# Patient Record
Sex: MALE
[De-identification: names, ages, dates, MRNs, and addresses within clinical notes are randomized per-mention and may not be internally consistent; named-entity substitution may affect disease eponyms.]

---

## 2018-10-10 ENCOUNTER — APPOINTMENT (OUTPATIENT)
Dept: INTERNAL MEDICINE | Facility: CLINIC | Age: 43
End: 2018-10-10
Payer: MEDICAID

## 2018-10-10 ENCOUNTER — NON-APPOINTMENT (OUTPATIENT)
Age: 43
End: 2018-10-10

## 2018-10-10 ENCOUNTER — LABORATORY RESULT (OUTPATIENT)
Age: 43
End: 2018-10-10

## 2018-10-10 VITALS
SYSTOLIC BLOOD PRESSURE: 125 MMHG | OXYGEN SATURATION: 99 % | BODY MASS INDEX: 25.16 KG/M2 | TEMPERATURE: 97.9 F | WEIGHT: 151 LBS | HEIGHT: 65 IN | HEART RATE: 85 BPM | DIASTOLIC BLOOD PRESSURE: 74 MMHG

## 2018-10-10 DIAGNOSIS — Z78.9 OTHER SPECIFIED HEALTH STATUS: ICD-10-CM

## 2018-10-10 PROCEDURE — 99406 BEHAV CHNG SMOKING 3-10 MIN: CPT

## 2018-10-10 PROCEDURE — 93000 ELECTROCARDIOGRAM COMPLETE: CPT

## 2018-10-10 NOTE — HISTORY OF PRESENT ILLNESS
[de-identified] : PT COMES FOR CPE,LAST ONE 3 Y AGO\par LAST SEVERAL MONTHS HAS BEEN LOOSING WT ,DEVELOPED ANXIETY/DEPRESSION DUE TO STRESS AND BEEN OFF WORK,DEVELOPED DYSPEPSIA AND POOR APPETITE\par LAST 6 WEEKS FEELING MUCH BETTER;SEEN BY PSYCH AND PLACED ON WELLBUTRIN 150 MG DAILY FOR DEPRESSION AND AS AN AID FOR SMOKING CESSATION\par

## 2018-10-10 NOTE — ASSESSMENT
[FreeTextEntry1] : INITIAL CPE OF 43 Y OLD MALE WITH RECENT DX OF JIMY =ON WELLBUTRIN BY PSYCH\par RECENT WT LOSS AND SX OF ? IBS= REFER TO SEE DIETICIAN AND GASTROENTEROLOGIST\par LABS AND EKG TODAY ORDERED\par RTO AS PER RESULTS

## 2018-10-10 NOTE — COUNSELING
[Weight management counseling provided] : Weight management [Healthy eating counseling provided] : healthy eating [Activity counseling provided] : activity [Smoking cessation counseling provided] : smoking cessation [Behavioral health counseling provided] : behavioral health  [Engage in a relaxing activity] : Engage in a relaxing activity [Tobacco Use Cessation Intermediate Greater Than 3 Minutes Up to 10 Minutes] : Tobacco Use Cessation Intermediate Greater Than 3 Minutes Up to 10 Minutes

## 2018-10-11 ENCOUNTER — OTHER (OUTPATIENT)
Age: 43
End: 2018-10-11

## 2018-10-11 LAB
25(OH)D3 SERPL-MCNC: 16.7 NG/ML
ALBUMIN SERPL ELPH-MCNC: 4.4 G/DL
ALP BLD-CCNC: 74 U/L
ALT SERPL-CCNC: 13 U/L
ANION GAP SERPL CALC-SCNC: 11 MMOL/L
APPEARANCE: CLEAR
AST SERPL-CCNC: 27 U/L
BASOPHILS # BLD AUTO: 0.05 K/UL
BASOPHILS NFR BLD AUTO: 0.6 %
BILIRUB SERPL-MCNC: 0.7 MG/DL
BILIRUBIN URINE: NEGATIVE
BLOOD URINE: NEGATIVE
BUN SERPL-MCNC: 10 MG/DL
CALCIUM SERPL-MCNC: 9.7 MG/DL
CHLORIDE SERPL-SCNC: 104 MMOL/L
CHOLEST SERPL-MCNC: 160 MG/DL
CHOLEST/HDLC SERPL: 4.8 RATIO
CO2 SERPL-SCNC: 24 MMOL/L
COLOR: YELLOW
CREAT SERPL-MCNC: 0.88 MG/DL
EOSINOPHIL # BLD AUTO: 0.24 K/UL
EOSINOPHIL NFR BLD AUTO: 3.1 %
GLUCOSE QUALITATIVE U: NEGATIVE MG/DL
GLUCOSE SERPL-MCNC: 71 MG/DL
HCT VFR BLD CALC: 41.1 %
HDLC SERPL-MCNC: 33 MG/DL
HGB BLD-MCNC: 13 G/DL
IMM GRANULOCYTES NFR BLD AUTO: 0.3 %
KETONES URINE: NEGATIVE
LDLC SERPL CALC-MCNC: 113 MG/DL
LEUKOCYTE ESTERASE URINE: NEGATIVE
LYMPHOCYTES # BLD AUTO: 1.25 K/UL
LYMPHOCYTES NFR BLD AUTO: 16.2 %
MAN DIFF?: NORMAL
MCHC RBC-ENTMCNC: 19.6 PG
MCHC RBC-ENTMCNC: 31.6 GM/DL
MCV RBC AUTO: 61.9 FL
MONOCYTES # BLD AUTO: 0.53 K/UL
MONOCYTES NFR BLD AUTO: 6.9 %
NEUTROPHILS # BLD AUTO: 5.64 K/UL
NEUTROPHILS NFR BLD AUTO: 72.9 %
NITRITE URINE: NEGATIVE
PH URINE: 6
PLATELET # BLD AUTO: 258 K/UL
POTASSIUM SERPL-SCNC: 4.6 MMOL/L
PROT SERPL-MCNC: 8.3 G/DL
PROTEIN URINE: NEGATIVE MG/DL
PSA FREE FLD-MCNC: 25.3
PSA FREE SERPL-MCNC: 0.21 NG/ML
PSA SERPL-MCNC: 0.83 NG/ML
RBC # BLD: 6.64 M/UL
RBC # FLD: 18.9 %
SODIUM SERPL-SCNC: 139 MMOL/L
SPECIFIC GRAVITY URINE: 1.01
TRIGL SERPL-MCNC: 71 MG/DL
TSH SERPL-ACNC: 3.32 UIU/ML
UROBILINOGEN URINE: NEGATIVE MG/DL
WBC # FLD AUTO: 7.73 K/UL

## 2018-10-11 RX ORDER — CHOLECALCIFEROL (VITAMIN D3) 50 MCG
50 MCG TABLET ORAL DAILY
Qty: 90 | Refills: 3 | Status: COMPLETED | COMMUNITY
Start: 2018-10-11

## 2019-02-15 ENCOUNTER — APPOINTMENT (OUTPATIENT)
Dept: INTERNAL MEDICINE | Facility: CLINIC | Age: 44
End: 2019-02-15

## 2019-11-15 ENCOUNTER — LABORATORY RESULT (OUTPATIENT)
Age: 44
End: 2019-11-15

## 2019-11-15 ENCOUNTER — APPOINTMENT (OUTPATIENT)
Dept: INTERNAL MEDICINE | Facility: CLINIC | Age: 44
End: 2019-11-15
Payer: COMMERCIAL

## 2019-11-15 ENCOUNTER — NON-APPOINTMENT (OUTPATIENT)
Age: 44
End: 2019-11-15

## 2019-11-15 VITALS
HEART RATE: 78 BPM | OXYGEN SATURATION: 98 % | BODY MASS INDEX: 26.66 KG/M2 | HEIGHT: 65 IN | DIASTOLIC BLOOD PRESSURE: 74 MMHG | WEIGHT: 160 LBS | TEMPERATURE: 98.4 F | SYSTOLIC BLOOD PRESSURE: 128 MMHG | RESPIRATION RATE: 14 BRPM

## 2019-11-15 DIAGNOSIS — K40.90 UNILATERAL INGUINAL HERNIA, W/OUT OBSTRUCTION OR GANGRENE, NOT SPECIFIED AS RECURRENT: ICD-10-CM

## 2019-11-15 PROCEDURE — 99396 PREV VISIT EST AGE 40-64: CPT

## 2019-11-15 PROCEDURE — 93000 ELECTROCARDIOGRAM COMPLETE: CPT

## 2019-11-15 NOTE — PHYSICAL EXAM
[Well Nourished] : well nourished [No Acute Distress] : no acute distress [Well Developed] : well developed [Well-Appearing] : well-appearing [Normal Sclera/Conjunctiva] : normal sclera/conjunctiva [EOMI] : extraocular movements intact [PERRL] : pupils equal round and reactive to light [No JVD] : no jugular venous distention [Normal Oropharynx] : the oropharynx was normal [Normal Outer Ear/Nose] : the outer ears and nose were normal in appearance [Thyroid Normal, No Nodules] : the thyroid was normal and there were no nodules present [Supple] : supple [No Lymphadenopathy] : no lymphadenopathy [Clear to Auscultation] : lungs were clear to auscultation bilaterally [No Respiratory Distress] : no respiratory distress  [No Accessory Muscle Use] : no accessory muscle use [Regular Rhythm] : with a regular rhythm [Normal Rate] : normal rate  [Normal S1, S2] : normal S1 and S2 [No Murmur] : no murmur heard [No Carotid Bruits] : no carotid bruits [No Abdominal Bruit] : a ~M bruit was not heard ~T in the abdomen [Pedal Pulses Present] : the pedal pulses are present [No Varicosities] : no varicosities [No Palpable Aorta] : no palpable aorta [No Extremity Clubbing/Cyanosis] : no extremity clubbing/cyanosis [No Edema] : there was no peripheral edema [Soft] : abdomen soft [Non Tender] : non-tender [Non-distended] : non-distended [No Masses] : no abdominal mass palpated [Normal] : normal [Normal Bowel Sounds] : normal bowel sounds [Soft, Nontender] : the abdomen was soft and nontender [No Mass] : no masses were palpated [No HSM] : no hepatosplenomegaly noted [Inguinal Hernia Left] : a left inguinal hernia was present [] : which was reducible [Normal Posterior Cervical Nodes] : no posterior cervical lymphadenopathy [Normal Anterior Cervical Nodes] : no anterior cervical lymphadenopathy [No CVA Tenderness] : no CVA  tenderness [No Spinal Tenderness] : no spinal tenderness [No Joint Swelling] : no joint swelling [Grossly Normal Strength/Tone] : grossly normal strength/tone [Coordination Grossly Intact] : coordination grossly intact [No Rash] : no rash [Normal Gait] : normal gait [No Focal Deficits] : no focal deficits [Deep Tendon Reflexes (DTR)] : deep tendon reflexes were 2+ and symmetric [Normal Mental Status] : the patient's orientation, memory, attention, language and fund of knowledge were normal [Normal Insight/Judgement] : insight and judgment were intact [Normal Affect] : the affect was normal [Impaired judgment] : intact judgment [Appropriate] : appropriate [Impaired Insight] : intact insight

## 2019-11-15 NOTE — HISTORY OF PRESENT ILLNESS
[de-identified] : PT COMES FOR CPE \par CC OF ED FOR LAST MONTHS ,IN GOOD RELATIONSHIP WITH GIRLFRIEND \par ALSO CC OF LEFT GROIN PRESSURE/APIN LAST FEW MONTHS\par SEEN BY PSYCH ON CELEXA 30 ADDED 5/19\par STILL SMOKING BUT CUT DOWN FROM 15 TO 7 CIG A DAY

## 2019-11-15 NOTE — REVIEW OF SYSTEMS
[Abdominal Pain] : abdominal pain [Anxiety] : anxiety [Impotence] : impotence [Depression] : depression [Negative] : Heme/Lymph

## 2019-11-15 NOTE — ASSESSMENT
[FreeTextEntry1] : CPE OF 44 Y OLD MALE WITH PMX OF DEPRESSION = F/U PSYCH\par IBS= AS PER GI\par NEWLY DX LEFT INGUINAL HERNIA= REFER TO SEE SURGEON\par ED= TRIAL OF LEVITRA 20 MG DAILY ;REFER TO SEE

## 2019-11-15 NOTE — COUNSELING
[Encouraged to pick a quit date and identify support needed to quit] : Encouraged to pick a quit date and identify support needed to quit [Cessation strategies including cessation program discussed] : Cessation strategies including cessation program discussed [Tobacco Use Cessation Intermediate Greater Than 3 Minutes Up to 10 Minutes] : Tobacco Use Cessation Intermediate Greater Than 3 Minutes Up to 10 Minutes [FreeTextEntry1] : 3

## 2019-11-17 LAB
25(OH)D3 SERPL-MCNC: 39.3 NG/ML
ALBUMIN SERPL ELPH-MCNC: 4.2 G/DL
ALP BLD-CCNC: 78 U/L
ALT SERPL-CCNC: 18 U/L
ANION GAP SERPL CALC-SCNC: 14 MMOL/L
APPEARANCE: CLEAR
AST SERPL-CCNC: 19 U/L
BASOPHILS # BLD AUTO: 0.07 K/UL
BASOPHILS NFR BLD AUTO: 1 %
BILIRUB SERPL-MCNC: 0.8 MG/DL
BILIRUBIN URINE: NEGATIVE
BLOOD URINE: NEGATIVE
BUN SERPL-MCNC: 12 MG/DL
CALCIUM SERPL-MCNC: 9.4 MG/DL
CHLORIDE SERPL-SCNC: 104 MMOL/L
CHOLEST SERPL-MCNC: 147 MG/DL
CHOLEST/HDLC SERPL: 5.1 RATIO
CO2 SERPL-SCNC: 23 MMOL/L
COLOR: YELLOW
CREAT SERPL-MCNC: 1.18 MG/DL
EOSINOPHIL # BLD AUTO: 0.36 K/UL
EOSINOPHIL NFR BLD AUTO: 5 %
GLUCOSE QUALITATIVE U: NEGATIVE
GLUCOSE SERPL-MCNC: 87 MG/DL
HCT VFR BLD CALC: 40.5 %
HDLC SERPL-MCNC: 29 MG/DL
HGB BLD-MCNC: 12.3 G/DL
IMM GRANULOCYTES NFR BLD AUTO: 0.4 %
KETONES URINE: NEGATIVE
LDLC SERPL CALC-MCNC: 102 MG/DL
LEUKOCYTE ESTERASE URINE: NEGATIVE
LYMPHOCYTES # BLD AUTO: 1.49 K/UL
LYMPHOCYTES NFR BLD AUTO: 20.5 %
MAN DIFF?: NORMAL
MCHC RBC-ENTMCNC: 19 PG
MCHC RBC-ENTMCNC: 30.4 GM/DL
MCV RBC AUTO: 62.7 FL
MONOCYTES # BLD AUTO: 0.53 K/UL
MONOCYTES NFR BLD AUTO: 7.3 %
NEUTROPHILS # BLD AUTO: 4.79 K/UL
NEUTROPHILS NFR BLD AUTO: 65.8 %
NITRITE URINE: NEGATIVE
PH URINE: 6.5
PLATELET # BLD AUTO: 233 K/UL
POTASSIUM SERPL-SCNC: 4.2 MMOL/L
PROT SERPL-MCNC: 7.6 G/DL
PROTEIN URINE: NORMAL
PSA FREE FLD-MCNC: 22 %
PSA FREE SERPL-MCNC: 0.11 NG/ML
PSA SERPL-MCNC: 0.52 NG/ML
RBC # BLD: 6.46 M/UL
RBC # FLD: 19.5 %
SODIUM SERPL-SCNC: 141 MMOL/L
SPECIFIC GRAVITY URINE: 1.01
TRIGL SERPL-MCNC: 80 MG/DL
TSH SERPL-ACNC: 3.51 UIU/ML
UROBILINOGEN URINE: NORMAL
WBC # FLD AUTO: 7.27 K/UL

## 2019-11-21 LAB
TESTOST BND SERPL-MCNC: 8 PG/ML
TESTOST SERPL-MCNC: 519.5 NG/DL

## 2021-11-19 ENCOUNTER — NON-APPOINTMENT (OUTPATIENT)
Age: 46
End: 2021-11-19

## 2021-11-19 ENCOUNTER — APPOINTMENT (OUTPATIENT)
Dept: INTERNAL MEDICINE | Facility: CLINIC | Age: 46
End: 2021-11-19
Payer: COMMERCIAL

## 2021-11-19 ENCOUNTER — LABORATORY RESULT (OUTPATIENT)
Age: 46
End: 2021-11-19

## 2021-11-19 VITALS
HEART RATE: 85 BPM | WEIGHT: 170 LBS | TEMPERATURE: 98.9 F | SYSTOLIC BLOOD PRESSURE: 100 MMHG | RESPIRATION RATE: 14 BRPM | HEIGHT: 65 IN | OXYGEN SATURATION: 96 % | DIASTOLIC BLOOD PRESSURE: 60 MMHG | BODY MASS INDEX: 28.32 KG/M2

## 2021-11-19 DIAGNOSIS — Z00.00 ENCOUNTER FOR GENERAL ADULT MEDICAL EXAMINATION W/OUT ABNORMAL FINDINGS: ICD-10-CM

## 2021-11-19 PROCEDURE — 99406 BEHAV CHNG SMOKING 3-10 MIN: CPT

## 2021-11-19 PROCEDURE — 93000 ELECTROCARDIOGRAM COMPLETE: CPT

## 2021-11-19 PROCEDURE — 99396 PREV VISIT EST AGE 40-64: CPT

## 2021-11-19 PROCEDURE — 99213 OFFICE O/P EST LOW 20 MIN: CPT

## 2021-11-19 NOTE — PHYSICAL EXAM
[No Acute Distress] : no acute distress [Well Nourished] : well nourished [Well Developed] : well developed [Well-Appearing] : well-appearing [Normal Sclera/Conjunctiva] : normal sclera/conjunctiva [PERRL] : pupils equal round and reactive to light [EOMI] : extraocular movements intact [No JVD] : no jugular venous distention [No Lymphadenopathy] : no lymphadenopathy [Supple] : supple [Thyroid Normal, No Nodules] : the thyroid was normal and there were no nodules present [No Respiratory Distress] : no respiratory distress  [No Accessory Muscle Use] : no accessory muscle use [Decreased Breath Sounds] : breath sounds were decreased diffusely [Normal Rate] : normal rate  [Regular Rhythm] : with a regular rhythm [Normal S1, S2] : normal S1 and S2 [No Murmur] : no murmur heard [No Carotid Bruits] : no carotid bruits [No Abdominal Bruit] : a ~M bruit was not heard ~T in the abdomen [No Varicosities] : no varicosities [Pedal Pulses Present] : the pedal pulses are present [No Edema] : there was no peripheral edema [No Palpable Aorta] : no palpable aorta [No Extremity Clubbing/Cyanosis] : no extremity clubbing/cyanosis [Soft] : abdomen soft [Non Tender] : non-tender [Non-distended] : non-distended [No Masses] : no abdominal mass palpated [No HSM] : no HSM [Normal Bowel Sounds] : normal bowel sounds [Normal Posterior Cervical Nodes] : no posterior cervical lymphadenopathy [Normal Anterior Cervical Nodes] : no anterior cervical lymphadenopathy [No CVA Tenderness] : no CVA  tenderness [No Spinal Tenderness] : no spinal tenderness [No Joint Swelling] : no joint swelling [Grossly Normal Strength/Tone] : grossly normal strength/tone [No Rash] : no rash [Coordination Grossly Intact] : coordination grossly intact [No Focal Deficits] : no focal deficits [Normal Affect] : the affect was normal [Normal Insight/Judgement] : insight and judgment were intact

## 2021-11-19 NOTE — COUNSELING
[Cessation strategies including cessation program discussed] : Cessation strategies including cessation program discussed [Encouraged to pick a quit date and identify support needed to quit] : Encouraged to pick a quit date and identify support needed to quit [Tobacco Use Cessation Intermediate Greater Than 3 Minutes Up to 10 Minutes] : Tobacco Use Cessation Intermediate Greater Than 3 Minutes Up to 10 Minutes [FreeTextEntry1] : 5

## 2021-11-19 NOTE — ASSESSMENT
[FreeTextEntry1] : CPE OF 46 Y OLD MALE= LABS AND EKG SMOKING CESSATION COUNSELING ,AGAINST MEDS\par JIMY= RECOMM PSYCH EVAL FOR WORSENING SX\par CHRONIC COUGH = PULM EVAL RTO 1Y OR PRN \par HAD J&J VACCINE 4/21= RECOMM BOOSTER
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
## 2021-11-19 NOTE — REVIEW OF SYSTEMS
[Fatigue] : fatigue [Cough] : cough [Joint Pain] : joint pain [Back Pain] : back pain [Headache] : no headache [Anxiety] : anxiety [Depression] : depression [Negative] : Heme/Lymph

## 2021-11-20 LAB
25(OH)D3 SERPL-MCNC: 17.3 NG/ML
ALBUMIN SERPL ELPH-MCNC: 4.5 G/DL
ALP BLD-CCNC: 97 U/L
ALT SERPL-CCNC: 27 U/L
ANION GAP SERPL CALC-SCNC: 16 MMOL/L
APPEARANCE: CLEAR
AST SERPL-CCNC: 26 U/L
BASOPHILS # BLD AUTO: 0.07 K/UL
BASOPHILS NFR BLD AUTO: 0.7 %
BILIRUB SERPL-MCNC: 0.5 MG/DL
BILIRUBIN URINE: NEGATIVE
BLOOD URINE: NEGATIVE
BUN SERPL-MCNC: 17 MG/DL
CALCIUM SERPL-MCNC: 9.6 MG/DL
CHLORIDE SERPL-SCNC: 105 MMOL/L
CHOLEST SERPL-MCNC: 144 MG/DL
CO2 SERPL-SCNC: 21 MMOL/L
COLOR: COLORLESS
CREAT SERPL-MCNC: 1.04 MG/DL
EOSINOPHIL # BLD AUTO: 0.25 K/UL
EOSINOPHIL NFR BLD AUTO: 2.4 %
GLUCOSE QUALITATIVE U: NEGATIVE
GLUCOSE SERPL-MCNC: 81 MG/DL
HCT VFR BLD CALC: 45.6 %
HDLC SERPL-MCNC: 30 MG/DL
HGB BLD-MCNC: 13.8 G/DL
IMM GRANULOCYTES NFR BLD AUTO: 0.4 %
KETONES URINE: NEGATIVE
LDLC SERPL CALC-MCNC: 88 MG/DL
LEUKOCYTE ESTERASE URINE: NEGATIVE
LYMPHOCYTES # BLD AUTO: 2.13 K/UL
LYMPHOCYTES NFR BLD AUTO: 20.9 %
MAN DIFF?: NORMAL
MCHC RBC-ENTMCNC: 19.7 PG
MCHC RBC-ENTMCNC: 30.3 GM/DL
MCV RBC AUTO: 65.2 FL
MONOCYTES # BLD AUTO: 0.66 K/UL
MONOCYTES NFR BLD AUTO: 6.5 %
NEUTROPHILS # BLD AUTO: 7.06 K/UL
NEUTROPHILS NFR BLD AUTO: 69.1 %
NITRITE URINE: NEGATIVE
NONHDLC SERPL-MCNC: 114 MG/DL
PH URINE: 6.5
PLATELET # BLD AUTO: 256 K/UL
POTASSIUM SERPL-SCNC: 4.4 MMOL/L
PROT SERPL-MCNC: 8 G/DL
PROTEIN URINE: NEGATIVE
PSA FREE FLD-MCNC: 31 %
PSA FREE SERPL-MCNC: 0.21 NG/ML
PSA SERPL-MCNC: 0.69 NG/ML
RBC # BLD: 6.99 M/UL
RBC # FLD: 21 %
SODIUM SERPL-SCNC: 142 MMOL/L
SPECIFIC GRAVITY URINE: 1.01
TRIGL SERPL-MCNC: 132 MG/DL
TSH SERPL-ACNC: 3.65 UIU/ML
UROBILINOGEN URINE: NORMAL
WBC # FLD AUTO: 10.21 K/UL

## 2021-11-23 DIAGNOSIS — E55.9 VITAMIN D DEFICIENCY, UNSPECIFIED: ICD-10-CM

## 2021-12-16 ENCOUNTER — APPOINTMENT (OUTPATIENT)
Dept: PULMONOLOGY | Facility: CLINIC | Age: 46
End: 2021-12-16
Payer: COMMERCIAL

## 2021-12-16 ENCOUNTER — APPOINTMENT (OUTPATIENT)
Dept: HEART AND VASCULAR | Facility: CLINIC | Age: 46
End: 2021-12-16
Payer: COMMERCIAL

## 2021-12-16 VITALS
WEIGHT: 172 LBS | DIASTOLIC BLOOD PRESSURE: 77 MMHG | HEIGHT: 65 IN | RESPIRATION RATE: 14 BRPM | SYSTOLIC BLOOD PRESSURE: 121 MMHG | OXYGEN SATURATION: 96 % | HEART RATE: 90 BPM | BODY MASS INDEX: 28.66 KG/M2 | TEMPERATURE: 97.2 F

## 2021-12-16 DIAGNOSIS — G47.8 OTHER SLEEP DISORDERS: ICD-10-CM

## 2021-12-16 PROCEDURE — 99204 OFFICE O/P NEW MOD 45 MIN: CPT | Mod: 25

## 2021-12-16 PROCEDURE — ZZZZZ: CPT

## 2021-12-16 PROCEDURE — 93306 TTE W/DOPPLER COMPLETE: CPT

## 2021-12-16 PROCEDURE — 94060 EVALUATION OF WHEEZING: CPT

## 2021-12-16 PROCEDURE — 94726 PLETHYSMOGRAPHY LUNG VOLUMES: CPT

## 2021-12-16 PROCEDURE — 94729 DIFFUSING CAPACITY: CPT

## 2021-12-16 NOTE — ASSESSMENT
[FreeTextEntry1] : 46-year-old man with no past medical history, active smoker here for first evaluation with this provider.\par \par CHEST PAIN\par -Most likely noncardiac in origin, and  likely related to anxiety\par -in the setting of persistence of symptoms and severe limitations to his everyday life will obtain exercise stress test to rule out ischemia\par -echocardiogram to rule out any wall motion abnormalities\par \par HTN\par -Well-controlled off medications\par \par HLD\par -LDL well controlled off medication\par -Continue with healthy diet and exercise\par \par Follow-up in 2 weeks for echo and stress test results

## 2021-12-16 NOTE — HISTORY OF PRESENT ILLNESS
[Current] : current [Never] : never [TextBox_4] : Patient is  a 46-year-old male with past medical history significant for depression, 1 pack/day smoker who is referred for pulmonary consult.  The patient complains of occasional cough and questionable pleuritic pain as well as back pain.  Patient also complains of nonrestorative sleep and morning headaches.  He has been told that he snores.  Patient also states that he works in a mold infested area.  He denies fevers chills chest pain weight loss or hemoptysis [TextBox_11] : 1

## 2021-12-16 NOTE — REASON FOR VISIT
[FreeTextEntry1] : 46-year-old man with no past medical history here for first evaluation with this provider.\par Patient is  is an active smoker

## 2021-12-16 NOTE — ASSESSMENT
[FreeTextEntry1] : In summary the patient is a 46-year-old male with past medical history significant for tobacco abuse, anxiety and depression who was referred for pulmonary consult.  The patient's physical exam is within normal limits.  The patient underwent a pulmonary function test which revealed normal airways disease.  The patient is now started on Stiolto.\par \par Patient also complains of nonrestorative sleep morning headaches and awakening with a dry mouth.  His physical exam is significant for Mallampati score of 3.  A home sleep monitor has been ordered and the patient is instructed to follow-up in 2 to 4 weeks

## 2021-12-22 ENCOUNTER — APPOINTMENT (OUTPATIENT)
Dept: INTERNAL MEDICINE | Facility: CLINIC | Age: 46
End: 2021-12-22
Payer: COMMERCIAL

## 2021-12-22 VITALS
OXYGEN SATURATION: 97 % | SYSTOLIC BLOOD PRESSURE: 134 MMHG | BODY MASS INDEX: 28.49 KG/M2 | RESPIRATION RATE: 16 BRPM | HEIGHT: 65 IN | WEIGHT: 171 LBS | HEART RATE: 110 BPM | DIASTOLIC BLOOD PRESSURE: 86 MMHG | TEMPERATURE: 98.8 F

## 2021-12-22 PROCEDURE — 99214 OFFICE O/P EST MOD 30 MIN: CPT

## 2021-12-22 RX ORDER — BUPROPION HYDROCHLORIDE 300 MG/1
300 TABLET, EXTENDED RELEASE ORAL
Refills: 0 | Status: DISCONTINUED | COMMUNITY
End: 2021-12-22

## 2021-12-22 RX ORDER — VARDENAFIL 20 MG/1
20 TABLET, FILM COATED ORAL
Qty: 5 | Refills: 3 | Status: DISCONTINUED | COMMUNITY
Start: 2019-11-15 | End: 2021-12-22

## 2021-12-22 RX ORDER — CITALOPRAM 20 MG/1
20 TABLET, FILM COATED ORAL
Refills: 0 | Status: DISCONTINUED | COMMUNITY
End: 2021-12-22

## 2021-12-22 NOTE — ASSESSMENT
[FreeTextEntry1] : 46 Y OLD MALE WITH R LAT EPICONDYLITIS= ORTHO EVAL \par NOTE FOR WORK WILL BE PROVIDED\par JIMY= PSYCH RECOMM\par PT WAS REQUESTING TESTOSTERONE ,CORTISOL AND DOPAMINE SERUM LEVELS ;EXPLAINED THAT IS NO INDICATION FOR SUCH TESTING UNLESS RECOMM BY ENDO OR PSYCH

## 2021-12-22 NOTE — HISTORY OF PRESENT ILLNESS
[de-identified] : CC OF R FOREARM PAIN ,DIFFICULTY TO ZIP HER JACKET OR SHAKE HANDS ;HAS NOT BEEN ABLE TO GO BACK TO WORK \par ENERGY AND BREATHING BETTER '\par LESS ANXIETY AND MOOD SX \par SEEN BY PULM AND CARDIO\par NEEDS NOTE FOR WORK \par SEEN BY THERAPIST ,MAY SEE PSYCH SOON

## 2021-12-22 NOTE — REVIEW OF SYSTEMS
[Joint Pain] : joint pain [Muscle Pain] : muscle pain [Anxiety] : anxiety [Depression] : depression [Negative] : Heme/Lymph

## 2021-12-22 NOTE — PHYSICAL EXAM
[Normal] : no jugular venous distention, supple, no lymphadenopathy and the thyroid was normal and there were no nodules present [No Respiratory Distress] : no respiratory distress  [Normal Rate] : normal rate  [No Edema] : there was no peripheral edema [Normal Mental Status] : the patient's orientation, memory, attention, language and fund of knowledge were normal [Appropriate] : appropriate [Labile] : labile [Impaired judgment] : intact judgment [Impaired Insight] : intact insight [de-identified] : R LATERAL EPICONDYLAR PAIN

## 2021-12-30 ENCOUNTER — APPOINTMENT (OUTPATIENT)
Dept: ORTHOPEDIC SURGERY | Facility: CLINIC | Age: 46
End: 2021-12-30
Payer: COMMERCIAL

## 2021-12-30 VITALS — WEIGHT: 170 LBS | BODY MASS INDEX: 27.32 KG/M2 | HEIGHT: 66 IN

## 2021-12-30 VITALS — SYSTOLIC BLOOD PRESSURE: 132 MMHG | DIASTOLIC BLOOD PRESSURE: 82 MMHG | HEART RATE: 101 BPM

## 2021-12-30 DIAGNOSIS — M25.511 PAIN IN RIGHT SHOULDER: ICD-10-CM

## 2021-12-30 DIAGNOSIS — G89.29 PAIN IN RIGHT SHOULDER: ICD-10-CM

## 2021-12-30 DIAGNOSIS — M77.11 LATERAL EPICONDYLITIS, RIGHT ELBOW: ICD-10-CM

## 2021-12-30 PROCEDURE — 73030 X-RAY EXAM OF SHOULDER: CPT | Mod: RT

## 2021-12-30 PROCEDURE — 99203 OFFICE O/P NEW LOW 30 MIN: CPT

## 2021-12-30 PROCEDURE — 72040 X-RAY EXAM NECK SPINE 2-3 VW: CPT

## 2022-01-03 ENCOUNTER — APPOINTMENT (OUTPATIENT)
Dept: ORTHOPEDIC SURGERY | Facility: CLINIC | Age: 47
End: 2022-01-03

## 2022-01-04 ENCOUNTER — APPOINTMENT (OUTPATIENT)
Dept: HEART AND VASCULAR | Facility: CLINIC | Age: 47
End: 2022-01-04

## 2022-01-05 PROBLEM — M25.511 CHRONIC RIGHT SHOULDER PAIN: Status: ACTIVE | Noted: 2022-01-05

## 2022-01-05 PROBLEM — M77.11 LATERAL EPICONDYLITIS OF RIGHT ELBOW: Status: ACTIVE | Noted: 2021-12-22
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
## 2022-01-05 NOTE — DISCUSSION/SUMMARY
[de-identified] : 47 y/o male with right cervical/periscapular pain and right lateral epicondylitis. \par \par Patient has myofascial discomfort through the right upper extremity throughout the periscapular/cervical/shoulder region. There is mild radiation of symptoms to the upper extremity consistent with a neurologic or component.  X-ray shows some degenerative change from C4-C6.  There is also mild pain with range of motion of right shoulder. I discussed with the patient that the majority of symptoms are not directly related to shoulder internal derangement/pathology and may or may not be directly related to cervical dysfunction. Treatment for this condition is through conservative/nonoperative means; including anti-inflammatory medication, physical therapy, massage therapy, acupuncture, and chiropractic care.\par \par Patient also presents with right elbow pain. I discussed with the patient the treatment of lateral epicondylitis. I discussed that this is a degenerative condition rather than an inflammatory condition and that the process tends to be reversible (albeit can last from 6-24mos if __  untreated). The best source of treatment is to reduce the offending repetitive overuse injury process and I counseled the patient on how to do this. First line treatment can include watchful waiting for a period of 6-8 wks with specific activity modification and OTC NSAID's/Tylenol. Further treatment includes the use of counterforce bracing, physical therapy for stretching and strengthening, and the use of injection therapy (steroids/PRP etc). I also discussed the potential role of surgical intervention for chronic symptoms that persist greater than 6-12 months.\par \par At this time as treatment I recommended a period of relative rest, stretching and strengthening modalities as indicated in a patient handout provided as well as counterforce bracing. If no improvement over the next few months, additional treatment such as corticosteroid injection versus formal physical therapy can be performed.\par \par Follow-up with Spine orthopedic.

## 2022-01-05 NOTE — ADDENDUM
[FreeTextEntry1] : This note was written by Monica Perez on 12/30/2021 acting solely as a scribe for Dr. Allan Sharif.\par \par All medical record entries made by the Scribe were at my, Dr. Allan Sharif, direction and personally dictated by me on 12/30/2021. I have personally reviewed the chart and agree that the record accurately reflects my personal performance of the history, physical exam, assessment and plan.

## 2022-01-05 NOTE — PHYSICAL EXAM
[de-identified] : Oriented to time, place, person\par Mood: Normal\par Affect: Normal\par Appearance: Healthy, well appearing, no acute distress.\par Gait: Normal\par Assistive Devices: None\par \par Right shoulder exam:\par \par Inspection: No malalignment, No defects, No atrophy\par Skin: No masses, No lesions\par Neck: Negative Spurling, full ROM, no pain with ROM\par AROM: FF to 180, abduction to 90, ER to 60, IR to upper lumbar\par Painful arc ROM: none\par Tenderness: +trapezius pain,  +ttp medial scapula pain. No bicipital tenderness, no tenderness to greater tuberosity/RTC insertion, no anterior shoulder/lesser tuberosity tenderness\par Strength: 5/5 ER, 5/5 IR in adduction, 5/5 supraspinatus testing, negative Verden's test\par AC joint: No TTP/pain with cross arm testing\par Biceps: Speed Negative, Yergason Negative \par Impingement test: Negative Wade, Negative Neer\par Vasc: 2+ radial pulse \par Stability: Stable \par Neuro: AIN, PIN, Ulnar nerve intact to motor\par Sensation: Intact to light touch throughout \par \par Right elbow exam\par \par Skin: Clean, dry, intact. No ecchymosis. No swelling. No palpable joint effusion.\par ROM: Full extension/flexion, full supination/pronation.\par Painful ROM: Lateral elbow pain with resisted wrist extension\par Tenderness: No medial epicondyle pain. Positive lateral epicondyle pain (ECRB). No olecranon pain. No pain at radial head. No pain to radial tunnel.\par Strength: 5/5 elbow flexion, 5/5 elbow extension, 5/5 supination, 5/5 pronation\par Stability: Stable to vaus/valgus stress\par Vasc: 2+ radial pulse, <2s cap refill\par Sensation: In tact to light touch throughout\par Neuro: Negative Tinel at ulnar canal, AIN/PIN/Ulnar nerve in tact to motor/sensation.  [de-identified] : The following radiographs were ordered and read by me during this patients visit. I reviewed each radiograph in detail with the patient and discussed the findings as highlighted below.\par \par 3 views of right shoulder were obtained today, 12/30/2021, that show no acute fracture or dislocation. There is no glenohumeral and mild AC joint degenerative change seen. Type II acromion. There is no significant malalignment. No significant other obvious osseous abnormality, otherwise unremarkable. \par \par 3 views of the cervical spine were obtained today, 12/30/2021, that show no acute fracture or dislocation. There is C 4-6 degenerative disk change seen. There is no gross malalignment. No significant other obvious osseous abnormality, otherwise unremarkable.

## 2022-01-11 ENCOUNTER — APPOINTMENT (OUTPATIENT)
Dept: HEART AND VASCULAR | Facility: CLINIC | Age: 47
End: 2022-01-11

## 2022-01-25 ENCOUNTER — APPOINTMENT (OUTPATIENT)
Dept: HEART AND VASCULAR | Facility: CLINIC | Age: 47
End: 2022-01-25

## 2022-01-25 ENCOUNTER — APPOINTMENT (OUTPATIENT)
Dept: PULMONOLOGY | Facility: CLINIC | Age: 47
End: 2022-01-25

## 2022-02-10 ENCOUNTER — APPOINTMENT (OUTPATIENT)
Dept: PULMONOLOGY | Facility: CLINIC | Age: 47
End: 2022-02-10
Payer: COMMERCIAL

## 2022-02-10 VITALS
TEMPERATURE: 98.2 F | HEART RATE: 97 BPM | SYSTOLIC BLOOD PRESSURE: 131 MMHG | RESPIRATION RATE: 14 BRPM | DIASTOLIC BLOOD PRESSURE: 80 MMHG | OXYGEN SATURATION: 98 %

## 2022-02-10 DIAGNOSIS — R06.83 SNORING: ICD-10-CM

## 2022-02-10 DIAGNOSIS — F17.210 NICOTINE DEPENDENCE, CIGARETTES, UNCOMPLICATED: ICD-10-CM

## 2022-02-10 DIAGNOSIS — F32.9 MAJOR DEPRESSIVE DISORDER, SINGLE EPISODE, UNSPECIFIED: ICD-10-CM

## 2022-02-10 DIAGNOSIS — F12.90 CANNABIS USE, UNSPECIFIED, UNCOMPLICATED: ICD-10-CM

## 2022-02-10 DIAGNOSIS — Z78.9 OTHER SPECIFIED HEALTH STATUS: ICD-10-CM

## 2022-02-10 DIAGNOSIS — R06.00 DYSPNEA, UNSPECIFIED: ICD-10-CM

## 2022-02-10 PROCEDURE — 99214 OFFICE O/P EST MOD 30 MIN: CPT

## 2022-02-10 NOTE — HISTORY OF PRESENT ILLNESS
[Current] : current [Never] : never [TextBox_4] : Patient is a 46-year-old male with past medical history significant for depression, nicotine abuse/asthma who presents today for follow-up.  Patient states that his cough and shortness of breath with associated dyspnea on exertion has improved with Stiolto.  The patient admits to and expresses going through bouts of depression over the last year and a half.  He currently denies fevers chills chest pain weight loss or hemoptysis

## 2022-02-10 NOTE — ASSESSMENT
[FreeTextEntry1] : In summary the patient is a 46-year-old male with past medical history significant for tobacco abuse, anxiety and depression who presents for follow up\par Patient's physical exam is significant for good air entry bilaterally.  I had a lengthy discussion with the patient and we both agree that he is depressed because of his current work situation and staying at home.  I have encouraged him to exercise.  He also states that his bronchodilators have helped significantly and therefore a repeat prescription has been sent to his pharmacy and he is instructed to follow-up in 3 months

## 2022-02-17 ENCOUNTER — APPOINTMENT (OUTPATIENT)
Dept: HEART AND VASCULAR | Facility: CLINIC | Age: 47
End: 2022-02-17

## 2022-02-28 ENCOUNTER — APPOINTMENT (OUTPATIENT)
Dept: INTERNAL MEDICINE | Facility: CLINIC | Age: 47
End: 2022-02-28
Payer: COMMERCIAL

## 2022-02-28 VITALS
WEIGHT: 169 LBS | TEMPERATURE: 98.2 F | HEIGHT: 66 IN | BODY MASS INDEX: 27.16 KG/M2 | HEART RATE: 93 BPM | DIASTOLIC BLOOD PRESSURE: 75 MMHG | OXYGEN SATURATION: 97 % | SYSTOLIC BLOOD PRESSURE: 147 MMHG | RESPIRATION RATE: 14 BRPM

## 2022-02-28 DIAGNOSIS — F32.A DEPRESSION, UNSPECIFIED: ICD-10-CM

## 2022-02-28 DIAGNOSIS — F17.211 NICOTINE DEPENDENCE, CIGARETTES, IN REMISSION: ICD-10-CM

## 2022-02-28 DIAGNOSIS — N52.9 MALE ERECTILE DYSFUNCTION, UNSPECIFIED: ICD-10-CM

## 2022-02-28 DIAGNOSIS — E66.3 OVERWEIGHT: ICD-10-CM

## 2022-02-28 PROCEDURE — 99407 BEHAV CHNG SMOKING > 10 MIN: CPT

## 2022-02-28 PROCEDURE — 99214 OFFICE O/P EST MOD 30 MIN: CPT

## 2022-02-28 NOTE — PHYSICAL EXAM
[Normal] : soft, non-tender, non-distended, no masses palpated, no HSM and normal bowel sounds [Alert and Oriented x3] : oriented to person, place, and time Cerclage placement

## 2022-02-28 NOTE — COUNSELING
[Cessation strategies including cessation program discussed] : Cessation strategies including cessation program discussed [Use of nicotine replacement therapies and other medications discussed] : Use of nicotine replacement therapies and other medications discussed [Encouraged to pick a quit date and identify support needed to quit] : Encouraged to pick a quit date and identify support needed to quit [FreeTextEntry3] : 12 [Benefits of weight loss discussed] : Benefits of weight loss discussed [Encouraged to increase physical activity] : Encouraged to increase physical activity

## 2022-02-28 NOTE — HISTORY OF PRESENT ILLNESS
[de-identified] : COMES FOR F/U\par CC OF ED 2 WEEKS AGO WHEN ON A DATE \par TRYING TO QUIT SMOKING BUT UNABLE TO CUT DOWN BELLOW 3/4 PPD \par EXERCISING MORE \par FEELING BETTER \par UNABLE TO LOOSE WT ,WANT TO SEE DIETICIAN

## 2022-02-28 NOTE — ASSESSMENT
[FreeTextEntry1] : 46 Y OLD MALE WITH DEPRESSION AND 3/4-1 PPD SMOKER WHO HAD QUIT 5-6 Y AGO WITH BUPROPION = RX SENT ,COUNSELING PROVIDED ,AMISHA REPLACEMENT AND CHANTIX DISCUSSED AS WELL \par ED = S[facility]DENA[facility] RX SENT ,REFER TO SEE EV \par RTO 2 M OR PRN

## 2022-03-03 ENCOUNTER — APPOINTMENT (OUTPATIENT)
Dept: HEART AND VASCULAR | Facility: CLINIC | Age: 47
End: 2022-03-03
Payer: COMMERCIAL

## 2022-03-03 VITALS
HEART RATE: 101 BPM | HEIGHT: 66 IN | BODY MASS INDEX: 27.16 KG/M2 | OXYGEN SATURATION: 96 % | DIASTOLIC BLOOD PRESSURE: 82 MMHG | SYSTOLIC BLOOD PRESSURE: 121 MMHG | WEIGHT: 169 LBS | TEMPERATURE: 98.4 F | RESPIRATION RATE: 15 BRPM

## 2022-03-03 PROCEDURE — 99214 OFFICE O/P EST MOD 30 MIN: CPT | Mod: 25

## 2022-03-03 PROCEDURE — 93015 CV STRESS TEST SUPVJ I&R: CPT

## 2022-03-03 PROCEDURE — ZZZZZ: CPT

## 2022-03-03 NOTE — ASSESSMENT
[FreeTextEntry1] : 46-year-old man with no past medical history, active smoker here for first evaluation with this provider.\par \par CHEST PAIN/SOB\par -Resolved\par -As per description was most likely noncardiac in origin, and  likely related to anxiety\par -Normal EKG\par -Normal echocardiogram\par -Normal exercise stress test\par -In the setting of the above, recommend to continue to monitor and follow-up with her primary MD\par -Recommended smoking cessation \par \par HTN\par -Well-controlled off medications\par \par HLD\par -LDL well controlled off medication\par -Continue with healthy diet and exercise\par \par Follow-up in cardiology clinic as needed \par Recommend continue to follow-up with primary MD

## 2022-03-03 NOTE — REASON FOR VISIT
[FreeTextEntry1] : 46-year-old man with no past medical history, active smoker here for follow-up and exercise stress test\par The patient denies any chest pain.\par Reports a good exercise tolerance with episode of shortness of breath.\par He is still smoking.\par Echocardiogram reviewed\par \par \par As per previous note on 12/22/2021:\par 46-year-old man with no past medical history, active smoker here for first evaluation with this provider.\par The patient reports feeling very anxious and generalized body pain including right arm pain and  chest tightess, described a pinch radiating to left shoulder. \par Also with atypical tingling sensation and shortness of breath in the morning\par Can walk 2 miiles without anginal pain.  Practices martial arts 3 times a week without any problems\par \par he is now on leave of absence from work because of this generalized pains\par \par PMH \par none\par \par ALL\par NKDA\par \par MEDS\par none\par \par SH\par SMokes MJ, social etoh\par \par \par EKG 11/19/2021 SR, wnl\par EKG 3/3/2022: Sinus rhythm, within normal limits\par \par Labs 11/20/2021\par Triglycerides 132\par Cholesterol 144\par HDL 30\par LDL 88\par \par \par Echocardiogram 12/17/2021\par EF of 60 to 65%\par Normal mitral valve\par \par Exercise stress test 3/3/2022\par Patient exercised for 8 minutes 59 seconds on a treadmill achieving 91% of MPHR\par Negative exercise stress test\par

## 2022-03-04 ENCOUNTER — APPOINTMENT (OUTPATIENT)
Dept: UROLOGY | Facility: CLINIC | Age: 47
End: 2022-03-04

## 2022-03-13 DIAGNOSIS — M54.2 CERVICALGIA: ICD-10-CM

## 2022-03-28 ENCOUNTER — APPOINTMENT (OUTPATIENT)
Dept: ORTHOPEDIC SURGERY | Facility: CLINIC | Age: 47
End: 2022-03-28

## 2024-03-25 ENCOUNTER — APPOINTMENT (OUTPATIENT)
Dept: INTERNAL MEDICINE | Facility: CLINIC | Age: 49
End: 2024-03-25

## 2024-05-14 ENCOUNTER — APPOINTMENT (OUTPATIENT)
Dept: INTERNAL MEDICINE | Facility: CLINIC | Age: 49
End: 2024-05-14
Payer: COMMERCIAL

## 2024-05-14 VITALS
SYSTOLIC BLOOD PRESSURE: 124 MMHG | DIASTOLIC BLOOD PRESSURE: 83 MMHG | BODY MASS INDEX: 27.16 KG/M2 | HEART RATE: 91 BPM | OXYGEN SATURATION: 96 % | WEIGHT: 169 LBS | HEIGHT: 66 IN | RESPIRATION RATE: 15 BRPM

## 2024-05-14 DIAGNOSIS — M54.9 DORSALGIA, UNSPECIFIED: ICD-10-CM

## 2024-05-14 DIAGNOSIS — M54.50 LOW BACK PAIN, UNSPECIFIED: ICD-10-CM

## 2024-05-14 PROCEDURE — 99213 OFFICE O/P EST LOW 20 MIN: CPT

## 2024-05-14 RX ORDER — BUPROPION HYDROCHLORIDE 150 MG/1
150 TABLET, FILM COATED, EXTENDED RELEASE ORAL
Qty: 60 | Refills: 2 | Status: DISCONTINUED | COMMUNITY
Start: 2022-02-28 | End: 2024-05-14

## 2024-05-14 RX ORDER — PREDNISONE 5 MG/1
0 TABLET ORAL
Refills: 0 | DISCHARGE
Start: 2024-05-14

## 2024-05-14 RX ORDER — TIOTROPIUM BROMIDE AND OLODATEROL 3.124; 2.736 UG/1; UG/1
2.5-2.5 SPRAY, METERED RESPIRATORY (INHALATION)
Qty: 1 | Refills: 3 | Status: DISCONTINUED | COMMUNITY
Start: 2022-02-10 | End: 2024-05-14

## 2024-05-14 RX ORDER — CYCLOBENZAPRINE HCL 10 MG
0 TABLET ORAL
Refills: 0 | DISCHARGE
Start: 2024-05-14

## 2024-05-14 RX ORDER — CHOLECALCIFEROL (VITAMIN D3) 50 MCG
50 MCG TABLET ORAL DAILY
Qty: 30 | Refills: 3 | Status: DISCONTINUED | COMMUNITY
Start: 2021-11-23 | End: 2024-05-14

## 2024-05-14 RX ORDER — SILDENAFIL 100 MG/1
100 TABLET, FILM COATED ORAL
Qty: 5 | Refills: 1 | Status: DISCONTINUED | COMMUNITY
Start: 2022-02-28 | End: 2024-05-14

## 2024-05-14 RX ORDER — CYCLOBENZAPRINE HYDROCHLORIDE 10 MG/1
10 TABLET, FILM COATED ORAL
Qty: 14 | Refills: 0 | Status: ACTIVE | COMMUNITY
Start: 2024-05-14 | End: 1900-01-01

## 2024-05-14 NOTE — HISTORY OF PRESENT ILLNESS
[FreeTextEntry1] : back pain [de-identified] : patient presents c/o right lower back pain rad to RLE x 1 week.

## 2024-05-16 ENCOUNTER — TRANSCRIPTION ENCOUNTER (OUTPATIENT)
Age: 49
End: 2024-05-16

## 2024-05-28 ENCOUNTER — NON-APPOINTMENT (OUTPATIENT)
Age: 49
End: 2024-05-28

## 2024-05-28 RX ORDER — PREDNISONE 20 MG/1
20 TABLET ORAL TWICE DAILY
Qty: 10 | Refills: 0 | Status: ACTIVE | COMMUNITY
Start: 2024-05-14 | End: 1900-01-01

## 2024-06-05 DIAGNOSIS — R93.5 ABNORMAL FINDINGS ON DIAGNOSTIC IMAGING OF OTHER ABDOMINAL REGIONS, INCLUDING RETROPERITONEUM: ICD-10-CM

## 2024-07-10 ENCOUNTER — LABORATORY RESULT (OUTPATIENT)
Age: 49
End: 2024-07-10

## 2024-07-10 ENCOUNTER — NON-APPOINTMENT (OUTPATIENT)
Age: 49
End: 2024-07-10

## 2024-07-10 ENCOUNTER — APPOINTMENT (OUTPATIENT)
Dept: INTERNAL MEDICINE | Facility: CLINIC | Age: 49
End: 2024-07-10

## 2024-07-10 VITALS
OXYGEN SATURATION: 98 % | DIASTOLIC BLOOD PRESSURE: 83 MMHG | BODY MASS INDEX: 27.97 KG/M2 | RESPIRATION RATE: 14 BRPM | HEIGHT: 66 IN | HEART RATE: 83 BPM | TEMPERATURE: 98.6 F | WEIGHT: 174 LBS | SYSTOLIC BLOOD PRESSURE: 135 MMHG

## 2024-07-10 DIAGNOSIS — M54.50 LOW BACK PAIN, UNSPECIFIED: ICD-10-CM

## 2024-07-10 DIAGNOSIS — F17.211 NICOTINE DEPENDENCE, CIGARETTES, IN REMISSION: ICD-10-CM

## 2024-07-10 DIAGNOSIS — Z00.00 ENCOUNTER FOR GENERAL ADULT MEDICAL EXAMINATION W/OUT ABNORMAL FINDINGS: ICD-10-CM

## 2024-07-10 DIAGNOSIS — F17.210 NICOTINE DEPENDENCE, CIGARETTES, UNCOMPLICATED: ICD-10-CM

## 2024-07-10 DIAGNOSIS — M54.9 DORSALGIA, UNSPECIFIED: ICD-10-CM

## 2024-07-10 PROCEDURE — 99407 BEHAV CHNG SMOKING > 10 MIN: CPT

## 2024-07-10 PROCEDURE — 99213 OFFICE O/P EST LOW 20 MIN: CPT | Mod: 25

## 2024-07-10 PROCEDURE — 99396 PREV VISIT EST AGE 40-64: CPT

## 2024-07-10 RX ORDER — TADALAFIL 10 MG/1
10 TABLET, FILM COATED ORAL
Qty: 30 | Refills: 1 | Status: ACTIVE | COMMUNITY
Start: 2024-07-10 | End: 1900-01-01

## 2024-07-10 RX ORDER — VARENICLINE TARTRATE 0.5 (11)-1
0.5 MG X 11 & KIT ORAL
Qty: 1 | Refills: 0 | Status: ACTIVE | COMMUNITY
Start: 2024-07-10 | End: 1900-01-01

## 2024-07-10 RX ORDER — VARENICLINE TARTRATE 1 MG/1
0 TABLET, FILM COATED ORAL
Qty: 0 | Refills: 2 | DISCHARGE
Start: 2024-07-10

## 2024-07-10 RX ORDER — TADALAFIL 10 MG/1
0 TABLET, FILM COATED ORAL
Qty: 0 | Refills: 1 | DISCHARGE
Start: 2024-07-10

## 2024-07-10 RX ORDER — VARENICLINE TARTRATE 1 MG/1
0 TABLET, FILM COATED ORAL
Qty: 0 | Refills: 0 | DISCHARGE
Start: 2024-07-10

## 2024-07-10 RX ORDER — VARENICLINE 1 MG/1
1 TABLET, FILM COATED ORAL
Qty: 60 | Refills: 2 | Status: ACTIVE | COMMUNITY
Start: 2024-07-10 | End: 1900-01-01

## 2024-07-11 LAB
25(OH)D3 SERPL-MCNC: 63.1 NG/ML
ALBUMIN SERPL ELPH-MCNC: 4.3 G/DL
ALP BLD-CCNC: 81 U/L
ALT SERPL-CCNC: 25 U/L
ANION GAP SERPL CALC-SCNC: 15 MMOL/L
APPEARANCE: CLEAR
AST SERPL-CCNC: 27 U/L
BILIRUB SERPL-MCNC: 0.8 MG/DL
BILIRUBIN URINE: NEGATIVE
BLOOD URINE: NEGATIVE
BUN SERPL-MCNC: 13 MG/DL
CALCIUM SERPL-MCNC: 9.1 MG/DL
CHLORIDE SERPL-SCNC: 105 MMOL/L
CHOLEST SERPL-MCNC: 136 MG/DL
CO2 SERPL-SCNC: 21 MMOL/L
COLOR: YELLOW
CREAT SERPL-MCNC: 1.22 MG/DL
EGFR: 74 ML/MIN/1.73M2
ERYTHROCYTE [SEDIMENTATION RATE] IN BLOOD BY WESTERGREN METHOD: 34 MM/HR
GLUCOSE QUALITATIVE U: NEGATIVE MG/DL
GLUCOSE SERPL-MCNC: 81 MG/DL
HCT VFR BLD CALC: 41.4 %
HDLC SERPL-MCNC: 26 MG/DL
HGB BLD-MCNC: 13.2 G/DL
LDLC SERPL CALC-MCNC: 87 MG/DL
MCHC RBC-ENTMCNC: 20.1 PG
MCHC RBC-ENTMCNC: 31.9 GM/DL
MCV RBC AUTO: 63.1 FL
NITRITE URINE: NEGATIVE
NONHDLC SERPL-MCNC: 109 MG/DL
PH URINE: 7
PLATELET # BLD AUTO: 236 K/UL
POTASSIUM SERPL-SCNC: 4.1 MMOL/L
PROT SERPL-MCNC: 7.5 G/DL
PROTEIN URINE: NEGATIVE MG/DL
PSA FREE FLD-MCNC: 35 %
PSA FREE SERPL-MCNC: 0.23 NG/ML
PSA SERPL-MCNC: 0.65 NG/ML
RBC # BLD: 6.56 M/UL
RBC # FLD: 19.6 %
SODIUM SERPL-SCNC: 140 MMOL/L
TRIGL SERPL-MCNC: 121 MG/DL
UROBILINOGEN URINE: 0.2 MG/DL
WBC # FLD AUTO: 9.15 K/UL

## 2024-07-15 LAB
ALBUMIN MFR SERPL ELPH: 52.7 %
ALBUMIN/GLOB SERPL: 1.1 RATIO
ALPHA1 GLOB MFR SERPL ELPH: 4.1 %
ALPHA1 GLOB SERPL ELPH-MCNC: 0.3 G/DL
ALPHA2 GLOB MFR SERPL ELPH: 7.9 %
B-GLOBULIN MFR SERPL ELPH: 16.5 %
B-GLOBULIN SERPL ELPH-MCNC: 1.2 G/DL
GAMMA GLOB FLD ELPH-MCNC: 1.4 G/DL
GAMMA GLOB MFR SERPL ELPH: 18.8 %
INTERPRETATION SERPL IEP-IMP: NORMAL
PROT SERPL-MCNC: 7.5 G/DL
PROT SERPL-MCNC: 7.5 G/DL

## 2024-09-25 ENCOUNTER — APPOINTMENT (OUTPATIENT)
Age: 49
End: 2024-09-25
Payer: COMMERCIAL

## 2024-09-25 ENCOUNTER — LABORATORY RESULT (OUTPATIENT)
Age: 49
End: 2024-09-25

## 2024-09-25 VITALS
BODY MASS INDEX: 27.64 KG/M2 | HEART RATE: 129 BPM | SYSTOLIC BLOOD PRESSURE: 151 MMHG | OXYGEN SATURATION: 92 % | TEMPERATURE: 102.6 F | HEIGHT: 66 IN | WEIGHT: 172 LBS | DIASTOLIC BLOOD PRESSURE: 80 MMHG | RESPIRATION RATE: 15 BRPM

## 2024-09-25 DIAGNOSIS — J40 BRONCHITIS, NOT SPECIFIED AS ACUTE OR CHRONIC: ICD-10-CM

## 2024-09-25 PROCEDURE — 99214 OFFICE O/P EST MOD 30 MIN: CPT

## 2024-09-25 RX ORDER — LEVOFLOXACIN 500 MG/1
500 TABLET, FILM COATED ORAL DAILY
Qty: 7 | Refills: 0 | Status: ACTIVE | COMMUNITY
Start: 2024-09-25 | End: 1900-01-01

## 2024-09-25 RX ORDER — ALBUTEROL SULFATE 90 UG/1
108 (90 BASE) INHALANT RESPIRATORY (INHALATION)
Qty: 1 | Refills: 0 | Status: ACTIVE | COMMUNITY
Start: 2024-09-25 | End: 1900-01-01

## 2024-09-25 RX ORDER — ALBUTEROL 90 MCG
0 AEROSOL (GRAM) INHALATION
Refills: 0 | DISCHARGE
Start: 2024-09-25

## 2024-09-26 ENCOUNTER — INPATIENT (INPATIENT)
Facility: HOSPITAL | Age: 49
LOS: 0 days | Discharge: HOME | End: 2024-09-27
Attending: HOSPITALIST | Admitting: HOSPITALIST
Payer: COMMERCIAL

## 2024-09-26 VITALS
HEART RATE: 105 BPM | RESPIRATION RATE: 18 BRPM | OXYGEN SATURATION: 92 % | DIASTOLIC BLOOD PRESSURE: 81 MMHG | TEMPERATURE: 98 F | WEIGHT: 175.05 LBS | SYSTOLIC BLOOD PRESSURE: 121 MMHG

## 2024-09-26 DIAGNOSIS — R05.9 COUGH, UNSPECIFIED: ICD-10-CM

## 2024-09-26 DIAGNOSIS — Z98.890 OTHER SPECIFIED POSTPROCEDURAL STATES: Chronic | ICD-10-CM

## 2024-09-26 LAB
ADD ON TEST-SPECIMEN IN LAB: SIGNIFICANT CHANGE UP
ADD ON TEST-SPECIMEN IN LAB: SIGNIFICANT CHANGE UP
ALBUMIN SERPL ELPH-MCNC: 3 G/DL — LOW (ref 3.3–5)
ALBUMIN SERPL ELPH-MCNC: 3.2 G/DL
ALP BLD-CCNC: 128 U/L
ALP SERPL-CCNC: 134 U/L — HIGH (ref 40–120)
ALT FLD-CCNC: 52 U/L — HIGH (ref 4–41)
ALT SERPL-CCNC: 49 U/L
ANION GAP SERPL CALC-SCNC: 13 MMOL/L
ANION GAP SERPL CALC-SCNC: 14 MMOL/L — SIGNIFICANT CHANGE UP (ref 7–14)
ANISOCYTOSIS BLD QL: SLIGHT — SIGNIFICANT CHANGE UP
AST SERPL-CCNC: 70 U/L
AST SERPL-CCNC: 75 U/L — HIGH (ref 4–40)
BASOPHILS # BLD AUTO: 0 K/UL — SIGNIFICANT CHANGE UP (ref 0–0.2)
BASOPHILS NFR BLD AUTO: 0 % — SIGNIFICANT CHANGE UP (ref 0–2)
BILIRUB SERPL-MCNC: 1.3 MG/DL — HIGH (ref 0.2–1.2)
BILIRUB SERPL-MCNC: 1.8 MG/DL
BLOOD GAS VENOUS COMPREHENSIVE RESULT: SIGNIFICANT CHANGE UP
BUN SERPL-MCNC: 19 MG/DL
BUN SERPL-MCNC: 22 MG/DL — SIGNIFICANT CHANGE UP (ref 7–23)
CALCIUM SERPL-MCNC: 8 MG/DL
CALCIUM SERPL-MCNC: 9.1 MG/DL — SIGNIFICANT CHANGE UP (ref 8.4–10.5)
CHLORIDE SERPL-SCNC: 94 MMOL/L
CHLORIDE SERPL-SCNC: 96 MMOL/L — LOW (ref 98–107)
CO2 SERPL-SCNC: 20 MMOL/L
CO2 SERPL-SCNC: 21 MMOL/L — LOW (ref 22–31)
CREAT SERPL-MCNC: 1.17 MG/DL — SIGNIFICANT CHANGE UP (ref 0.5–1.3)
CREAT SERPL-MCNC: 1.34 MG/DL
DACRYOCYTES BLD QL SMEAR: SLIGHT — SIGNIFICANT CHANGE UP
EGFR: 66 ML/MIN/1.73M2
EGFR: 77 ML/MIN/1.73M2 — SIGNIFICANT CHANGE UP
EOSINOPHIL # BLD AUTO: 0 K/UL — SIGNIFICANT CHANGE UP (ref 0–0.5)
EOSINOPHIL NFR BLD AUTO: 0 % — SIGNIFICANT CHANGE UP (ref 0–6)
FLUAV AG NPH QL: SIGNIFICANT CHANGE UP
FLUBV AG NPH QL: SIGNIFICANT CHANGE UP
GLUCOSE SERPL-MCNC: 92 MG/DL
GLUCOSE SERPL-MCNC: 98 MG/DL — SIGNIFICANT CHANGE UP (ref 70–99)
HCT VFR BLD CALC: 32.2 %
HCT VFR BLD CALC: 33.1 % — LOW (ref 39–50)
HGB BLD-MCNC: 10.7 G/DL
HGB BLD-MCNC: 11.2 G/DL — LOW (ref 13–17)
HYPOCHROMIA BLD QL: SIGNIFICANT CHANGE UP
IANC: 19.4 K/UL — HIGH (ref 1.8–7.4)
LYMPHOCYTES # BLD AUTO: 1.6 K/UL — SIGNIFICANT CHANGE UP (ref 1–3.3)
LYMPHOCYTES # BLD AUTO: 7 % — LOW (ref 13–44)
M PNEUMO IGM SER QL IA: 0.67 INDEX
MCHC RBC-ENTMCNC: 19.3 PG
MCHC RBC-ENTMCNC: 19.3 PG — LOW (ref 27–34)
MCHC RBC-ENTMCNC: 33.2 GM/DL
MCHC RBC-ENTMCNC: 33.8 GM/DL — SIGNIFICANT CHANGE UP (ref 32–36)
MCV RBC AUTO: 57.2 FL — LOW (ref 80–100)
MCV RBC AUTO: 58 FL
MICROCYTES BLD QL: SIGNIFICANT CHANGE UP
MONOCYTES # BLD AUTO: 1.21 K/UL — HIGH (ref 0–0.9)
MONOCYTES NFR BLD AUTO: 5.3 % — SIGNIFICANT CHANGE UP (ref 2–14)
MYCOPLASMA AG SPEC QL: NEGATIVE
NEUTROPHILS # BLD AUTO: 20.01 K/UL — HIGH (ref 1.8–7.4)
NEUTROPHILS NFR BLD AUTO: 87.7 % — HIGH (ref 43–77)
OVALOCYTES BLD QL SMEAR: SLIGHT — SIGNIFICANT CHANGE UP
PLAT MORPH BLD: NORMAL — SIGNIFICANT CHANGE UP
PLATELET # BLD AUTO: 291 K/UL
PLATELET # BLD AUTO: 326 K/UL — SIGNIFICANT CHANGE UP (ref 150–400)
PLATELET COUNT - ESTIMATE: NORMAL — SIGNIFICANT CHANGE UP
POIKILOCYTOSIS BLD QL AUTO: SIGNIFICANT CHANGE UP
POLYCHROMASIA BLD QL SMEAR: SLIGHT — SIGNIFICANT CHANGE UP
POTASSIUM SERPL-MCNC: 4.3 MMOL/L — SIGNIFICANT CHANGE UP (ref 3.5–5.3)
POTASSIUM SERPL-SCNC: 4.3 MMOL/L — SIGNIFICANT CHANGE UP (ref 3.5–5.3)
POTASSIUM SERPL-SCNC: 4.6 MMOL/L
PROT SERPL-MCNC: 7.2 G/DL
PROT SERPL-MCNC: 8.1 G/DL — SIGNIFICANT CHANGE UP (ref 6–8.3)
RBC # BLD: 5.55 M/UL
RBC # BLD: 5.79 M/UL — SIGNIFICANT CHANGE UP (ref 4.2–5.8)
RBC # FLD: 17.3 % — HIGH (ref 10.3–14.5)
RBC # FLD: 17.6 %
RBC BLD AUTO: ABNORMAL
RSV RNA NPH QL NAA+NON-PROBE: SIGNIFICANT CHANGE UP
SARS-COV-2 RNA SPEC QL NAA+PROBE: SIGNIFICANT CHANGE UP
SCHISTOCYTES BLD QL AUTO: SLIGHT — SIGNIFICANT CHANGE UP
SMUDGE CELLS # BLD: PRESENT — SIGNIFICANT CHANGE UP
SODIUM SERPL-SCNC: 127 MMOL/L
SODIUM SERPL-SCNC: 131 MMOL/L — LOW (ref 135–145)
TARGETS BLD QL SMEAR: SLIGHT — SIGNIFICANT CHANGE UP
WBC # BLD: 22.82 K/UL — HIGH (ref 3.8–10.5)
WBC # FLD AUTO: 21.85 K/UL
WBC # FLD AUTO: 22.82 K/UL — HIGH (ref 3.8–10.5)

## 2024-09-26 PROCEDURE — 99223 1ST HOSP IP/OBS HIGH 75: CPT

## 2024-09-26 PROCEDURE — 99285 EMERGENCY DEPT VISIT HI MDM: CPT

## 2024-09-26 PROCEDURE — 71046 X-RAY EXAM CHEST 2 VIEWS: CPT | Mod: 26

## 2024-09-26 RX ORDER — IPRATROPIUM BROMIDE AND ALBUTEROL SULFATE .5; 3 MG/3ML; MG/3ML
3 SOLUTION RESPIRATORY (INHALATION) ONCE
Refills: 0 | Status: COMPLETED | OUTPATIENT
Start: 2024-09-26 | End: 2024-09-26

## 2024-09-26 RX ORDER — CHLORHEXIDINE GLUCONATE ORAL RINSE 1.2 MG/ML
1 SOLUTION DENTAL DAILY
Refills: 0 | Status: DISCONTINUED | OUTPATIENT
Start: 2024-09-26 | End: 2024-09-27

## 2024-09-26 RX ORDER — CEFTRIAXONE SODIUM 1 G
1000 VIAL (EA) INJECTION ONCE
Refills: 0 | Status: COMPLETED | OUTPATIENT
Start: 2024-09-26 | End: 2024-09-26

## 2024-09-26 RX ORDER — INFLUENZA VIRUS VACCINE 15; 15; 15; 15 UG/.5ML; UG/.5ML; UG/.5ML; UG/.5ML
0.5 SUSPENSION INTRAMUSCULAR ONCE
Refills: 0 | Status: DISCONTINUED | OUTPATIENT
Start: 2024-09-26 | End: 2024-09-27

## 2024-09-26 RX ORDER — ACETAMINOPHEN 325 MG
650 TABLET ORAL EVERY 6 HOURS
Refills: 0 | Status: DISCONTINUED | OUTPATIENT
Start: 2024-09-26 | End: 2024-09-27

## 2024-09-26 RX ORDER — AZITHROMYCIN 250 MG/1
500 TABLET, FILM COATED ORAL ONCE
Refills: 0 | Status: COMPLETED | OUTPATIENT
Start: 2024-09-26 | End: 2024-09-26

## 2024-09-26 RX ADMIN — Medication 1000 MILLIGRAM(S): at 18:45

## 2024-09-26 RX ADMIN — Medication 100 MILLIGRAM(S): at 18:15

## 2024-09-26 RX ADMIN — IPRATROPIUM BROMIDE AND ALBUTEROL SULFATE 3 MILLILITER(S): .5; 3 SOLUTION RESPIRATORY (INHALATION) at 16:41

## 2024-09-26 RX ADMIN — AZITHROMYCIN 255 MILLIGRAM(S): 250 TABLET, FILM COATED ORAL at 18:54

## 2024-09-26 NOTE — H&P ADULT - ASSESSMENT
48 yo male active smoker (1.5 PPD for 20 years) with no other significant PMHx admitted for multifocal pneumonia, found to have transaminitis and increased beta globulin proteins on SPEP.

## 2024-09-26 NOTE — ED ADULT NURSE NOTE - OBJECTIVE STATEMENT
Received patient in Intake 7 c/o SOB, cough, myalgia, night sweats, fever. Patient is A&OX4, ambulatory, airway patent, breathing unlabored and even, radial pulses palpable, abdomen soft, nontender. Labs obtained, 20G IV placed on right arm, medication given as ordered, awaiting X-ray. Side rails up and safety maintained. Call bells within reach.

## 2024-09-26 NOTE — ED PROVIDER NOTE - PROGRESS NOTE DETAILS
48 yo M active smoker p/w 1 week of fever, SOB, chills and dry cough. Sent by PMD due to reported lab abnormalities. Found to be hypoxic to 92% on RA. Concern for pneumonia vs bronchitis, less likely concern for PE given no leg edema, recent travel or chest pain. 50 yo M active smoker p/w 1 week of fever, SOB, chills and dry cough. Sent by PMD due to reported lab abnormalities. Found to be hypoxic to 92% on RA. Concern for pneumonia vs bronchitis, less likely concern for PE given no leg edema, recent travel or chest pain. Will check CXR, flu/covid, CBC, CMP. plan to admit. Pt is in agreement with the plan. Multifocal pnuemonia on cxr. Pt becomes hypoxic with ambulation

## 2024-09-26 NOTE — ED PROVIDER NOTE - CLINICAL SUMMARY MEDICAL DECISION MAKING FREE TEXT BOX
49-year-old male with no known past medical history, smoker coming in with few days of cough, shortness of breath subjective fevers, body aches.  Reports he was seen by his PMD yesterday and started on antibiotics.  Took 2 doses.  Reports he is feeling better however he was told that if lab is abnormal.  Unsure which one.  No chest pain, shortness of breath, fevers today.  Denies abdominal pain, nausea, vomiting.  Denies any recent travels, pain or swelling in his lower extremities.  No history of MI in the past.  Patient is nontoxic-appearing.  No distress.  Very mild sporadic wheezing.  Overall good air entry.  Regular rate and rhythm.  Abdomen soft nontender.  No pedal edema.  No calf swelling or tenderness to palpation.  Patient is satting 97% on room air in the room.  Differential diagnoses include but not limited to pneumonia.  Eval for electrolyte abnormalities, anemia.  Low concern for ACS plan to obtain troponin.

## 2024-09-26 NOTE — H&P ADULT - NSHPPHYSICALEXAM_GEN_ALL_CORE
LOS:     VITALS:   T(C): 37.4 (09-26-24 @ 19:55), Max: 37.4 (09-26-24 @ 19:55)  HR: 117 (09-26-24 @ 19:55) (98 - 117)  BP: 125/83 (09-26-24 @ 19:55) (116/74 - 125/83)  RR: 18 (09-26-24 @ 19:55) (18 - 19)  SpO2: 96% (09-26-24 @ 19:55) (92% - 98%)    GENERAL: NAD, lying in bed comfortably  HEAD:  Atraumatic, Normocephalic  EYES: EOMI, PERRLA, conjunctiva and sclera clear  ENT: Moist mucous membranes  NECK: Supple, No JVD  CHEST/LUNG: Clear to auscultation bilaterally; No rales, rhonchi, wheezing, or rubs. Unlabored respirations  HEART: Regular rate and rhythm; No murmurs, rubs, or gallops  ABDOMEN: BSx4; Soft, nontender, nondistended  EXTREMITIES:  2+ Peripheral Pulses, brisk capillary refill. No clubbing, cyanosis, or edema  NERVOUS SYSTEM:  A&Ox3, no focal deficits   SKIN: No rashes or lesions

## 2024-09-26 NOTE — ED ADULT TRIAGE NOTE - CCCP TRG CHIEF CMPLNT
Pt decided to leave AMA. AMA formed signed by pt. Patient self removed PIV and located in trash. PIV properly disposed of. Charge nurse notified and hospitalist services paged for pt updated, awaiting call back.   shortness of breath

## 2024-09-26 NOTE — PATIENT PROFILE ADULT - FALL HARM RISK - HARM RISK INTERVENTIONS
Communicate Risk of Fall with Harm to all staff/Reinforce activity limits and safety measures with patient and family/Reorient to person, place and time as needed/Review medications for side effects contributing to fall risk/Sit up slowly, dangle for a short time, stand at bedside before walking/Tailored Fall Risk Interventions/Toileting schedule using arm’s reach rule for commode and bathroom/Use of alarms - bed, chair and/or voice tab/Visual Cue: Yellow wristband and red socks/Bed in lowest position, wheels locked, appropriate side rails in place/Call bell, personal items and telephone in reach/Instruct patient to call for assistance before getting out of bed or chair/Non-slip footwear when patient is out of bed/[place] to call system/Physically safe environment - no spills, clutter or unnecessary equipment/Purposeful Proactive Rounding/Room/bathroom lighting operational, light cord in reach

## 2024-09-26 NOTE — H&P ADULT - NSICDXPASTMEDICALHX_GEN_ALL_CORE_FT
PAST MEDICAL HISTORY:  Erectile dysfunction     History of acute bronchitis     Lumbago     Major depression     Nicotine dependence

## 2024-09-26 NOTE — ED ADULT TRIAGE NOTE - CHIEF COMPLAINT QUOTE
Pt sent by PCP for further evaluation of 1 week of fevers, cough, night sweats, and shortness of breath. Breathing currently unlabored. Denies Phx/ sick contacts Pt 02 92% on RA, provided supplemental 02 in triage

## 2024-09-26 NOTE — ED PROVIDER NOTE - PHYSICAL EXAMINATION
GENERAL: NAD, sitting in bed comfortably  HEAD:  Atraumatic, Normocephalic  EYES: EOMI, PERRLA, conjunctiva and sclera clear  ENT: Moist mucous membranes  NECK: Supple, No JVD  CHEST/LUNG: Decreased breath sounds on right with some crackles, left lung clear to auscultation. Unlabored respirations  HEART: Regular rate and rhythm; No murmurs, rubs, or gallops  ABDOMEN: BSx4; Soft, nontender, nondistended  EXTREMITIES:  2+ Peripheral Pulses, brisk capillary refill. No clubbing, cyanosis, or edema  NERVOUS SYSTEM:  A&Ox3, no focal deficits   SKIN: No rashes or lesions GENERAL: NAD, sitting in bed comfortably  HEAD:  Atraumatic, Normocephalic  EYES: EOMI, PERRLA, conjunctiva and sclera clear  ENT: Moist mucous membranes  NECK: Supple, No JVD  CHEST/LUNG: Mild wheezing on the right, left lung clear to auscultation. Unlabored respirations  HEART: Regular rate and rhythm; No murmurs, rubs, or gallops  ABDOMEN: BSx4; Soft, nontender, nondistended  EXTREMITIES:  2+ Peripheral Pulses, brisk capillary refill. No clubbing, cyanosis, or edema  NERVOUS SYSTEM:  A&Ox3, no focal deficits   SKIN: No rashes or lesions

## 2024-09-26 NOTE — H&P ADULT - HISTORY OF PRESENT ILLNESS
Patient is a 48 yo male active smoker (1.5 PPD for 20 years) with no other significant PMHx who presents with 1 week of cough, myalgias, night sweats and SOB. Patient reports feeling ill about 7 days ago, generalized myalgias and fatigue. Took temperature at home, found to be 103 F. Remained febrile for the next few days, with development of dry cough and SOB. Sleeping poorly at night and complaining of drenching night sweats, forcing him to change his clothes. Reports some associated diarrhea and decreased appetite. States that his fever began to drop about 2 days ago, but SOB remained. Saw his PMD yesterday, who told him one of his lungs sounded "funny". Prescribed him an antibiotic, which he has taken twice, and parth labs. Today, PMD called him and said labs were abnormal. Subsequently advised to go to the ED.     ED: mild hypoxia to 92% on RA and tachycardic to 105. Leukocytosis 22 on CBC. States that he becomes short of breath after climbing 1 flight of stairs, which is new for him. Denies chest pain. Denies recent sick contacts or international travel/plane flights. Denies any lower extremity edema.     Per ED note, pt started with Chantix in July and has been weaning down on his smoking. States he hasn't smoked a cigarette in about 1 week. Does not take any medications currently and denies any allergies. Patient is a 48 yo male active smoker (1.5 PPD for 20 years) with no other significant PMHx who presents with 1 week of cough, myalgias, night sweats and SOB. Patient reports feeling ill about 7 days ago, generalized myalgias and fatigue. Took temperature at home, found to be 103 F. Remained febrile for the next few days, with development of dry cough and SOB. Sleeping poorly at night and complaining of drenching night sweats, forcing him to change his clothes. Reports some associated diarrhea and decreased appetite. States that his fever began to drop about 2 days ago, but SOB remained. Saw his PMD yesterday, who told him one of his lungs sounded "funny". Prescribed him an antibiotic, which he has taken twice, and parth labs. Today, PMD called him and said labs were abnormal. Subsequently advised to go to the ED.         ED: mild hypoxia to 92% on RA and tachycardic to 105. Leukocytosis 22 on CBC. States that he becomes short of breath after climbing 1 flight of stairs, which is new for him. Denies chest pain. Denies recent sick contacts or international travel/plane flights. Denies any lower extremity edema.     Per ED note, pt started with Chantix in July and has been weaning down on his smoking. States he hasn't smoked a cigarette in about 1 week. Does not take any medications currently and denies any allergies.

## 2024-09-26 NOTE — ED PROVIDER NOTE - OBJECTIVE STATEMENT
Patient is a 50 yo male active smoker (1.5 PPD for 20 years) with no other significant PMHx who presents with 1 week of cough, myalgias, night sweats and SOB. Patient reports feeling ill about 7 days ago, generalized myalgias and fatigue. Took temperature at home, found to be 103 F. Remained febrile for the next few days, with development of dry cough and SOB. Sleeping poorly at night and complaining of drenching night sweats, forcing him to change his clothes. Reports some associated diarrhea and decreased appetite. States that his fever began to drop about 2 days ago, but SOB remained. Saw his PMD yesterday, who told him one of his lungs sounded "funny". Prescribed him an antibiotic, which he has taken twice, and parth labs. Today, PMD called him and said labs were abnormal. Subsequently advised to go to the ED.     In triage, found to be hypoxic to 92% on RA and tachycardic to 105. States that he becomes short of breath after climbing 1 flight of stairs, which is new for him. Denies chest pain. Denies recent sick contacts or international travel/plane flights. Denies any lower extremity edema.     States that he started with Chantix in July and has been weaning down on his smoking. States he hasn't smoked a cigarette in about 1 week. Patient is a 50 yo male active smoker (1.5 PPD for 20 years) with no other significant PMHx who presents with 1 week of cough, myalgias, night sweats and SOB. Patient reports feeling ill about 7 days ago, generalized myalgias and fatigue. Took temperature at home, found to be 103 F. Remained febrile for the next few days, with development of dry cough and SOB. Sleeping poorly at night and complaining of drenching night sweats, forcing him to change his clothes. Reports some associated diarrhea and decreased appetite. States that his fever began to drop about 2 days ago, but SOB remained. Saw his PMD yesterday, who told him one of his lungs sounded "funny". Prescribed him an antibiotic, which he has taken twice, and parth labs. Today, PMD called him and said labs were abnormal. Subsequently advised to go to the ED.     In triage, found to be hypoxic to 92% on RA and tachycardic to 105. States that he becomes short of breath after climbing 1 flight of stairs, which is new for him. Denies chest pain. Denies recent sick contacts or international travel/plane flights. Denies any lower extremity edema.     States that he started with Chantix in July and has been weaning down on his smoking. States he hasn't smoked a cigarette in about 1 week. Does not take any medications currently and denies any allergies.

## 2024-09-26 NOTE — H&P ADULT - PROBLEM SELECTOR PLAN 4
-MCV in the 50s   -Could also explain the rise B2 globulin (transferrin)    -Ferritin and iron studies   -Haptoglobin   -

## 2024-09-26 NOTE — H&P ADULT - PROBLEM SELECTOR PLAN 3
-Increased protein in the beta globulin region. Most likely in the setting of liver disease and associated transaminitis; could also be an m protein that migrated in the beta region. Proteins that migrate in this region include transport proteins such as transferrin, beta lipoprotein, and beta 2 microglobulin (which may be elevated in MM).     -Monoclonal gammopathy work up including Immunofixation electrophoresis, UPEP, light chains  - -Increased protein in the beta globulin region. Most likely in the setting of liver disease and associated transaminitis; could also be an m protein that migrated in the beta region. Proteins that migrate in this region include transport proteins such as transferrin, beta lipoprotein, and beta 2 microglobulin (which may be elevated in MM).     -Monoclonal gammopathy work up including Immunofixation electrophoresis, UPEP, light chains  -trend CBC   - -Increased protein in the beta globulin region. Most likely in the setting of liver disease and associated transaminitis; could also be an m protein that migrated in the beta region. Proteins that migrate in this region include transport proteins such as transferrin, beta lipoprotein, and beta 2 microglobulin (which may be elevated in MM).     -Monoclonal gammopathy work up including PATRICIA, UPEP, light chains, immunoglobulin testing  -Trend CBC

## 2024-09-26 NOTE — H&P ADULT - PROBLEM SELECTOR PLAN 1
-prescribed levofloxacin as an outpatient; pt has taken two doses   -Meets SIRS criteria (i.e. leukocytosis, elevated HR); no c/f shock   -RVP negative for influenza and COVID  -Chest X-ray: Bilateral airspace opacities most consistent with multifocal pneumonia   (pulmonary edema much less likely)  -+ Crackles on the R side on posterior auscultation     -Continue albuterol 90 mcg actuation inhaler for SOB   -Incentive spirometry  -Vitals q8  -Continuous pulse ox  -Continue levofloxacin? -prescribed levofloxacin as an outpatient; pt has taken two doses   -Meets SIRS criteria (i.e. leukocytosis, elevated HR); no c/f shock   -RVP negative for influenza and COVID  -Chest X-ray: Bilateral airspace opacities most consistent with multifocal pneumonia   (pulmonary edema much less likely)  -+ Crackles on the R side on posterior auscultation     -Continue albuterol 90 mcg actuation inhaler for SOB   -Continue ceftriaxone and azithromycin   -Incentive spirometry  -Continuous pulse ox -prescribed levofloxacin as an outpatient; pt has taken two doses   -Meets SIRS criteria (i.e. leukocytosis, elevated HR); no c/f shock   -RVP negative for influenza and COVID; mycoplasma negative   -Chest X-ray: Bilateral airspace opacities most consistent with multifocal pneumonia   (pulmonary edema much less likely)  -+ Crackles on the R side on posterior auscultation     -Continue albuterol 90 mcg actuation inhaler for SOB   -Continue ceftriaxone and azithromycin   -Incentive spirometry  -Continuous pulse ox  -Legionella Ag  -Strep pneumonia Ag

## 2024-09-26 NOTE — H&P ADULT - NSHPLABSRESULTS_GEN_ALL_CORE
LABS:                          11.2   22.82 )-----------( 326      ( 26 Sep 2024 16:30 )             33.1     Hb Trend: 11.2<--  WBC Trend: 22.82<--  Plt Trend: 326<--      09-26    131[L]  |  96[L]  |  22  ----------------------------<  98  4.3   |  21[L]  |  1.17    Ca    9.1      26 Sep 2024 16:30    TPro  8.1  /  Alb  3.0[L]  /  TBili  1.3[H]  /  DBili  x   /  AST  75[H]  /  ALT  52[H]  /  AlkPhos  134[H]  09-26      Lactate 1.2     Venous Profile: 7.34/39    RVP negative     BNP: 157     Troponin 8      IMAGING:    < from: Xray Chest 2 Views PA/Lat (09.26.24 @ 17:24) >    FINDINGS:    The heart is normal in size.  Bilateral predominantly peripheral airspace opacities greater on the   right than the left side likely multifocal pneumonia (Covid?).  Upper lung fields are clear.  Trace effusions may be present.    IMPRESSION:  Bilateral airspace opacities most consistent with multifocal pneumonia   (pulmonary edema much less likely)    --- End of Report ---    < end of copied text >

## 2024-09-26 NOTE — H&P ADULT - PROBLEM SELECTOR PLAN 2
-Mixed hepatocellular, cholestatic pattern. Most likely in the setting of previous alcohol use. Patient used to be a heavy drinker. No hx of autoimmune disease in the family. Very low suspicion for Avelino's, autoimmune hepatitis, PBC, PSC, alpha-1 antitrypsin  -Patient not currently endorsing any pain     -RUQ ultrasound to evaluate hepatobiliary system and determine cirrhosis  -Fibrosis scoring  -Fractionated bili -Mixed hepatocellular, cholestatic pattern. Most likely in the setting of previous alcohol use. Patient used to be a heavy drinker. No hx of autoimmune disease in the family. Very low suspicion for Avelino's, autoimmune hepatitis, PBC, PSC, alpha-1 antitrypsin  -Patient not currently endorsing any pain but with elevated liver enzymes      -RUQ ultrasound to evaluate hepatobiliary system and determine cirrhosis  -Fibrosis scoring; liver elastography   -Fractionated bili  -Hepatitis Panel  -Urine tox screen to r/o other causes of transaminitis   -HIV testing   -Acetaminophen level

## 2024-09-27 ENCOUNTER — TRANSCRIPTION ENCOUNTER (OUTPATIENT)
Age: 49
End: 2024-09-27

## 2024-09-27 VITALS — WEIGHT: 175.05 LBS

## 2024-09-27 DIAGNOSIS — R05.9 COUGH, UNSPECIFIED: ICD-10-CM

## 2024-09-27 DIAGNOSIS — D50.9 IRON DEFICIENCY ANEMIA, UNSPECIFIED: ICD-10-CM

## 2024-09-27 DIAGNOSIS — R77.8 OTHER SPECIFIED ABNORMALITIES OF PLASMA PROTEINS: ICD-10-CM

## 2024-09-27 DIAGNOSIS — J18.9 PNEUMONIA, UNSPECIFIED ORGANISM: ICD-10-CM

## 2024-09-27 DIAGNOSIS — Z29.9 ENCOUNTER FOR PROPHYLACTIC MEASURES, UNSPECIFIED: ICD-10-CM

## 2024-09-27 DIAGNOSIS — R74.01 ELEVATION OF LEVELS OF LIVER TRANSAMINASE LEVELS: ICD-10-CM

## 2024-09-27 PROBLEM — Z78.9 OTHER SPECIFIED HEALTH STATUS: Chronic | Status: INACTIVE | Noted: 2024-09-26 | Resolved: 2024-09-27

## 2024-09-27 LAB
ADD ON TEST-SPECIMEN IN LAB: SIGNIFICANT CHANGE UP
ADD ON TEST-SPECIMEN IN LAB: SIGNIFICANT CHANGE UP
ALBUMIN SERPL ELPH-MCNC: 2.8 G/DL — LOW (ref 3.3–5)
ALP SERPL-CCNC: 117 U/L — SIGNIFICANT CHANGE UP (ref 40–120)
ALT FLD-CCNC: 50 U/L — HIGH (ref 4–41)
ANION GAP SERPL CALC-SCNC: 16 MMOL/L — HIGH (ref 7–14)
APTT BLD: 31.7 SEC — SIGNIFICANT CHANGE UP (ref 24.5–35.6)
AST SERPL-CCNC: 64 U/L — HIGH (ref 4–40)
BASOPHILS # BLD AUTO: 0.04 K/UL — SIGNIFICANT CHANGE UP (ref 0–0.2)
BASOPHILS NFR BLD AUTO: 0.3 % — SIGNIFICANT CHANGE UP (ref 0–2)
BILIRUB DIRECT SERPL-MCNC: 0.4 MG/DL — HIGH (ref 0–0.3)
BILIRUB SERPL-MCNC: 0.8 MG/DL — SIGNIFICANT CHANGE UP (ref 0.2–1.2)
BLD GP AB SCN SERPL QL: NEGATIVE — SIGNIFICANT CHANGE UP
BLOOD GAS VENOUS COMPREHENSIVE RESULT: SIGNIFICANT CHANGE UP
BUN SERPL-MCNC: 20 MG/DL — SIGNIFICANT CHANGE UP (ref 7–23)
CALCIUM SERPL-MCNC: 8.4 MG/DL — SIGNIFICANT CHANGE UP (ref 8.4–10.5)
CHLORIDE SERPL-SCNC: 101 MMOL/L — SIGNIFICANT CHANGE UP (ref 98–107)
CHOLEST SERPL-MCNC: 75 MG/DL — SIGNIFICANT CHANGE UP
CO2 SERPL-SCNC: 19 MMOL/L — LOW (ref 22–31)
CREAT SERPL-MCNC: 1.15 MG/DL — SIGNIFICANT CHANGE UP (ref 0.5–1.3)
EGFR: 79 ML/MIN/1.73M2 — SIGNIFICANT CHANGE UP
EOSINOPHIL # BLD AUTO: 0.21 K/UL — SIGNIFICANT CHANGE UP (ref 0–0.5)
EOSINOPHIL NFR BLD AUTO: 1.6 % — SIGNIFICANT CHANGE UP (ref 0–6)
GLUCOSE SERPL-MCNC: 79 MG/DL — SIGNIFICANT CHANGE UP (ref 70–99)
HAPTOGLOB SERPL-MCNC: 308 MG/DL — HIGH (ref 34–200)
HAV IGM SER-ACNC: SIGNIFICANT CHANGE UP
HBV CORE AB SER-ACNC: SIGNIFICANT CHANGE UP
HBV CORE IGM SER-ACNC: SIGNIFICANT CHANGE UP
HBV E AG SER-ACNC: SIGNIFICANT CHANGE UP
HBV SURFACE AG SER-ACNC: SIGNIFICANT CHANGE UP
HCT VFR BLD CALC: 27.7 % — LOW (ref 39–50)
HCV AB S/CO SERPL IA: 0.11 S/CO — SIGNIFICANT CHANGE UP (ref 0–0.99)
HCV AB SERPL-IMP: SIGNIFICANT CHANGE UP
HDLC SERPL-MCNC: 9 MG/DL — LOW
HEMOGLOBIN INTERPRETATION: SIGNIFICANT CHANGE UP
HGB A MFR BLD: 92.8 % — LOW (ref 95–97.6)
HGB A2 MFR BLD: 5.6 % — HIGH (ref 2.4–3.5)
HGB BLD-MCNC: 9.7 G/DL — LOW (ref 13–17)
HGB F MFR BLD: 1.6 % — HIGH (ref 0–1.5)
HIV 1+2 AB+HIV1 P24 AG SERPL QL IA: SIGNIFICANT CHANGE UP
IANC: 9.72 K/UL — HIGH (ref 1.8–7.4)
IMM GRANULOCYTES NFR BLD AUTO: 3 % — HIGH (ref 0–0.9)
INR BLD: 1.31 RATIO — HIGH (ref 0.85–1.16)
LDH SERPL L TO P-CCNC: 252 U/L — HIGH (ref 135–225)
LEGIONELLA AG UR QL: POSITIVE
LIPID PNL WITH DIRECT LDL SERPL: 28 MG/DL — SIGNIFICANT CHANGE UP
LYMPHOCYTES # BLD AUTO: 1.56 K/UL — SIGNIFICANT CHANGE UP (ref 1–3.3)
LYMPHOCYTES # BLD AUTO: 11.9 % — LOW (ref 13–44)
MAGNESIUM SERPL-MCNC: 2.5 MG/DL — SIGNIFICANT CHANGE UP (ref 1.6–2.6)
MCHC RBC-ENTMCNC: 19.1 PG — LOW (ref 27–34)
MCHC RBC-ENTMCNC: 35 GM/DL — SIGNIFICANT CHANGE UP (ref 32–36)
MCV RBC AUTO: 54.5 FL — LOW (ref 80–100)
MONOCYTES # BLD AUTO: 1.21 K/UL — HIGH (ref 0–0.9)
MONOCYTES NFR BLD AUTO: 9.2 % — SIGNIFICANT CHANGE UP (ref 2–14)
MRSA PCR RESULT.: SIGNIFICANT CHANGE UP
NEUTROPHILS # BLD AUTO: 9.72 K/UL — HIGH (ref 1.8–7.4)
NEUTROPHILS NFR BLD AUTO: 74 % — SIGNIFICANT CHANGE UP (ref 43–77)
NON HDL CHOLESTEROL: 66 MG/DL — SIGNIFICANT CHANGE UP
NRBC # BLD: 0 /100 WBCS — SIGNIFICANT CHANGE UP (ref 0–0)
NRBC # FLD: 0 K/UL — SIGNIFICANT CHANGE UP (ref 0–0)
OSMOLALITY SERPL: 286 MOSM/KG — SIGNIFICANT CHANGE UP (ref 275–295)
PHOSPHATE SERPL-MCNC: 4 MG/DL — SIGNIFICANT CHANGE UP (ref 2.5–4.5)
PLATELET # BLD AUTO: 366 K/UL — SIGNIFICANT CHANGE UP (ref 150–400)
POTASSIUM SERPL-MCNC: 3.8 MMOL/L — SIGNIFICANT CHANGE UP (ref 3.5–5.3)
POTASSIUM SERPL-SCNC: 3.8 MMOL/L — SIGNIFICANT CHANGE UP (ref 3.5–5.3)
PROT SERPL-MCNC: 7.2 G/DL — SIGNIFICANT CHANGE UP (ref 6–8.3)
PROTHROM AB SERPL-ACNC: 15.5 SEC — HIGH (ref 9.9–13.4)
RBC # BLD: 5.08 M/UL — SIGNIFICANT CHANGE UP (ref 4.2–5.8)
RBC # BLD: 5.08 M/UL — SIGNIFICANT CHANGE UP (ref 4.2–5.8)
RBC # FLD: 15.6 % — HIGH (ref 10.3–14.5)
RETICS #: 40.9 K/UL — SIGNIFICANT CHANGE UP (ref 25–125)
RETICS/RBC NFR: 0.8 % — SIGNIFICANT CHANGE UP (ref 0.5–2.5)
RH IG SCN BLD-IMP: POSITIVE — SIGNIFICANT CHANGE UP
S AUREUS DNA NOSE QL NAA+PROBE: SIGNIFICANT CHANGE UP
SODIUM SERPL-SCNC: 136 MMOL/L — SIGNIFICANT CHANGE UP (ref 135–145)
TRIGL SERPL-MCNC: 191 MG/DL — HIGH
WBC # BLD: 13.14 K/UL — HIGH (ref 3.8–10.5)
WBC # FLD AUTO: 13.14 K/UL — HIGH (ref 3.8–10.5)

## 2024-09-27 PROCEDURE — 99239 HOSP IP/OBS DSCHRG MGMT >30: CPT | Mod: GC

## 2024-09-27 RX ORDER — CEFPODOXIME PROXETIL 50 MG/5 ML
1 SUSPENSION, RECONSTITUTED, ORAL (ML) ORAL
Qty: 20 | Refills: 0
Start: 2024-09-27 | End: 2024-10-06

## 2024-09-27 RX ORDER — AZITHROMYCIN 250 MG/1
1 TABLET, FILM COATED ORAL
Qty: 3 | Refills: 0
Start: 2024-09-27 | End: 2024-09-29

## 2024-09-27 RX ORDER — AZITHROMYCIN 250 MG/1
500 TABLET, FILM COATED ORAL DAILY
Refills: 0 | Status: DISCONTINUED | OUTPATIENT
Start: 2024-09-27 | End: 2024-09-27

## 2024-09-27 RX ORDER — CEFTRIAXONE SODIUM 1 G
1000 VIAL (EA) INJECTION EVERY 24 HOURS
Refills: 0 | Status: DISCONTINUED | OUTPATIENT
Start: 2024-09-27 | End: 2024-09-27

## 2024-09-27 RX ORDER — ALBUTEROL 90 MCG
2 AEROSOL (GRAM) INHALATION
Qty: 0 | Refills: 0 | DISCHARGE
Start: 2024-09-27

## 2024-09-27 RX ORDER — ALBUTEROL 90 MCG
2 AEROSOL (GRAM) INHALATION EVERY 6 HOURS
Refills: 0 | Status: DISCONTINUED | OUTPATIENT
Start: 2024-09-27 | End: 2024-09-27

## 2024-09-27 RX ADMIN — AZITHROMYCIN 500 MILLIGRAM(S): 250 TABLET, FILM COATED ORAL at 11:01

## 2024-09-27 RX ADMIN — Medication 100 MILLIGRAM(S): at 06:31

## 2024-09-27 RX ADMIN — CHLORHEXIDINE GLUCONATE ORAL RINSE 1 APPLICATION(S): 1.2 SOLUTION DENTAL at 11:01

## 2024-09-27 NOTE — DIETITIAN INITIAL EVALUATION ADULT - PERTINENT MEDS FT
MEDICATIONS  (STANDING):  azithromycin   Tablet 500 milliGRAM(s) Oral daily  cefTRIAXone   IVPB 1000 milliGRAM(s) IV Intermittent every 24 hours  chlorhexidine 2% Cloths 1 Application(s) Topical daily  influenza   Vaccine 0.5 milliLiter(s) IntraMuscular once    MEDICATIONS  (PRN):  acetaminophen     Tablet .. 650 milliGRAM(s) Oral every 6 hours PRN Temp greater or equal to 38C (100.4F), Mild Pain (1 - 3)  albuterol    90 MICROgram(s) HFA Inhaler 2 Puff(s) Inhalation every 6 hours PRN Shortness of Breath and/or Wheezing

## 2024-09-27 NOTE — DIETITIAN INITIAL EVALUATION ADULT - PERSON TAUGHT/METHOD
Healthy eating tips, How to add protein at breakfast, general healthy eating./verbal instruction/written material/patient instructed

## 2024-09-27 NOTE — DIETITIAN INITIAL EVALUATION ADULT - REASON FOR ADMISSION
Cough  Per chart review, pt is a 50 yo male active smoker (1.5 PPD for 20 years) with no other significant PMHx admitted for multifocal pneumonia, found to have transaminitis and increased beta globulin proteins on SPEP. Stable for discharge on 9/27/2024.      Cough  Per chart review, pt is a 50 yo male active smoker (1.5 PPD for 20 years) with no other significant PMHx admitted for multifocal pneumonia, found to have transaminitis and increased beta globulin proteins on SPEP. Stable for discharge on 9/27/2024.

## 2024-09-27 NOTE — DIETITIAN INITIAL EVALUATION ADULT - PROBLEM SELECTOR PLAN 1
-prescribed levofloxacin as an outpatient; pt has taken two doses   -Meets SIRS criteria (i.e. leukocytosis, elevated HR); no c/f shock   -RVP negative for influenza and COVID; mycoplasma negative   -Chest X-ray: Bilateral airspace opacities most consistent with multifocal pneumonia   (pulmonary edema much less likely)  -+ Crackles on the R side on posterior auscultation     -Continue albuterol 90 mcg actuation inhaler for SOB   -Continue ceftriaxone and azithromycin   -Incentive spirometry  -Continuous pulse ox  -Legionella Ag  -Strep pneumonia Ag

## 2024-09-27 NOTE — PROGRESS NOTE ADULT - PROBLEM SELECTOR PLAN 4
-MCV in the 50s   -Could also explain the rise B2 globulin (transferrin)    -Ferritin and iron studies   -Haptoglobin   - -MCV in the 50s   -Could also explain the rise B2 globulin (transferrin)  -Pt stated that he has a history of anemia and needed supplementation in the past     - Follow up outpatient

## 2024-09-27 NOTE — DIETITIAN INITIAL EVALUATION ADULT - PROBLEM SELECTOR PLAN 2
-Mixed hepatocellular, cholestatic pattern. Most likely in the setting of previous alcohol use. Patient used to be a heavy drinker. No hx of autoimmune disease in the family. Very low suspicion for Avelino's, autoimmune hepatitis, PBC, PSC, alpha-1 antitrypsin  -Patient not currently endorsing any pain but with elevated liver enzymes      -RUQ ultrasound to evaluate hepatobiliary system and determine cirrhosis  -Fibrosis scoring; liver elastography   -Fractionated bili  -Hepatitis Panel  -Urine tox screen to r/o other causes of transaminitis   -HIV testing   -Acetaminophen level

## 2024-09-27 NOTE — DISCHARGE NOTE PROVIDER - NSDCFUSCHEDAPPT_GEN_ALL_CORE_FT
Cedrick Hernandez  [facility]  [facility] [id number]  Scheduled Appointment: 10/07/2024

## 2024-09-27 NOTE — DIETITIAN INITIAL EVALUATION ADULT - PROBLEM SELECTOR PLAN 3
-Increased protein in the beta globulin region. Most likely in the setting of liver disease and associated transaminitis; could also be an m protein that migrated in the beta region. Proteins that migrate in this region include transport proteins such as transferrin, beta lipoprotein, and beta 2 microglobulin (which may be elevated in MM).     -Monoclonal gammopathy work up including PATRICIA, UPEP, light chains, immunoglobulin testing  -Trend CBC

## 2024-09-27 NOTE — DISCHARGE NOTE PROVIDER - NSDCFUADDINST_GEN_ALL_CORE_FT
Please follow up with your PCP regarding the followin. Microcytic anemia  2. Abnormal SPEP  Please follow up with your PCP regarding the followin. Microcytic anemia  2. Abnormal SPEP   3. Transaminitis

## 2024-09-27 NOTE — DIETITIAN INITIAL EVALUATION ADULT - ADD RECOMMEND
1. Continue present PO diet order, it remains appropriate at this time.   2. Nursing to document PO in RN flow sheets and monitor weekly weights.  3. Continue to monitor skin integrity, bowel regimen, and nutrition pertinent labs.

## 2024-09-27 NOTE — PROGRESS NOTE ADULT - PROBLEM SELECTOR PLAN 5
DVT PPx: Improve Score: Heparin 5000 subq Q8h/Lovenox 40mg QD   Diet: Regular/DASH/CC/Renal/NPO  Bowel Regimen: Miralax/Senna/Mag Citrate/ Lactulose/Enema  Code: Full/DNR/DNI   Dispo: PT/OT Pending Hospital Course  Pharmacy:  PCP:   Communication:     --------------------------------------------------------------  Victorina Robles MD  PGY-1, Internal Medicine  Microsoft Teams preferred  --------------------------------------------------------------

## 2024-09-27 NOTE — PROGRESS NOTE ADULT - PROBLEM SELECTOR PLAN 1
-prescribed levofloxacin as an outpatient; pt has taken two doses   -Meets SIRS criteria (i.e. leukocytosis, elevated HR); no c/f shock   -RVP negative for influenza and COVID; mycoplasma negative   -Chest X-ray: Bilateral airspace opacities most consistent with multifocal pneumonia   (pulmonary edema much less likely)  -+ Crackles on the R side on posterior auscultation     -Continue albuterol 90 mcg actuation inhaler for SOB   -Continue ceftriaxone and azithromycin   -Incentive spirometry  -Continuous pulse ox  -Legionella Ag  -Strep pneumonia Ag -prescribed levofloxacin as an outpatient; pt has taken two doses   -Meets SIRS criteria (i.e. leukocytosis, elevated HR); no c/f shock   -RVP negative for influenza and COVID; mycoplasma negative   -Chest X-ray: Bilateral airspace opacities most consistent with multifocal pneumonia   (pulmonary edema much less likely)  -+ Crackles on the R side on posterior auscultation   - Pt symptomatically improved today. Ambulatory sat >95.     -Continue albuterol 90 mcg actuation inhaler for SOB   -Discharge today with cefpodoxime and azithromycin   - f/u Legionella Ag and Strep pneumonia Ag

## 2024-09-27 NOTE — PROGRESS NOTE ADULT - PROBLEM SELECTOR PLAN 3
-Increased protein in the beta globulin region. Most likely in the setting of liver disease and associated transaminitis; could also be an m protein that migrated in the beta region. Proteins that migrate in this region include transport proteins such as transferrin, beta lipoprotein, and beta 2 microglobulin (which may be elevated in MM).     -Monoclonal gammopathy work up including PATRICIA, UPEP, light chains, immunoglobulin testing  -Trend CBC -Increased protein in the beta globulin region. Most likely in the setting of liver disease and associated transaminitis; could also be an m protein that migrated in the beta region. Proteins that migrate in this region include transport proteins such as transferrin, beta lipoprotein, and beta 2 microglobulin (which may be elevated in MM).     -Monoclonal gammopathy work up including PATRICIA, UPEP, light chains, immunoglobulin testing  - f/u outpatient

## 2024-09-27 NOTE — DISCHARGE NOTE PROVIDER - CARE PROVIDER_API CALL
Cedrick Hernandez  Internal Medicine  [address] [address] [place] [id number]  Phone: [phone]  Fax: [phone]  Established Patient  Follow Up Time: 2 weeks

## 2024-09-27 NOTE — DISCHARGE NOTE PROVIDER - NSDCMRMEDTOKEN_GEN_ALL_CORE_FT
Albuterol Sulfate  (90 Base) MCG/ACT Inhalation Aerosol Solution: Take two puffs every 6 hours as needed  Cyclobenzaprine HCl - 10 MG Oral Tablet: Take 1 tablet at bedtime  levoFLOXacin 500 MG Oral Tablet:   PredniSONE 20 MG Oral Tablet: Please take 1 tab two times a day  Tadalafil 10 MG Oral Tablet:   Varenicline Tartrate (Starter) 0.5 MG X 11 &amp; 1 MG X 42 Oral Tablet Therapy Pack:   Varenicline Tartrate 1 MG Oral Tablet:    albuterol 90 mcg/inh inhalation aerosol: 2 puff(s) inhaled every 6 hours As needed Shortness of Breath and/or Wheezing  azithromycin 500 mg oral tablet: 1 tab(s) orally once a day  cefpodoxime 200 mg oral tablet: 1 tab(s) orally 2 times a day

## 2024-09-27 NOTE — DISCHARGE NOTE PROVIDER - NSDCFUADDAPPT_GEN_ALL_CORE_FT
APPTS ARE READY TO BE MADE: [ ] YES    Best Family or Patient Contact (if needed):    Additional Information about above appointments (if needed):    1: Please obtain a repeat CT chest in 3 months    Other comments or requests:

## 2024-09-27 NOTE — PROGRESS NOTE ADULT - PROBLEM SELECTOR PLAN 2
-Mixed hepatocellular, cholestatic pattern. Most likely in the setting of previous alcohol use. Patient used to be a heavy drinker. No hx of autoimmune disease in the family. Very low suspicion for Avelino's, autoimmune hepatitis, PBC, PSC, alpha-1 antitrypsin  -Patient not currently endorsing any pain but with elevated liver enzymes      -RUQ ultrasound to evaluate hepatobiliary system and determine cirrhosis  -Fibrosis scoring; liver elastography   -Fractionated bili  -Hepatitis Panel  -Urine tox screen to r/o other causes of transaminitis   -HIV testing   -Acetaminophen level -Mixed hepatocellular, cholestatic pattern. Most likely in the setting of previous alcohol use. Patient used to be a heavy drinker. No hx of autoimmune disease in the family. Very low suspicion for Avelino's, autoimmune hepatitis, PBC, PSC, alpha-1 antitrypsin  -Patient not currently endorsing any pain but with elevated liver enzymes    - HIV negative   - LFT's improved today    -F/u LFT's outpatient   -Hepatitis Panel  -Urine tox screen to r/o other causes of transaminitis

## 2024-09-27 NOTE — DIETITIAN INITIAL EVALUATION ADULT - OTHER INFO
Patient is receiving a PO diet order. Denies difficulty chewing or swallowing. Reports fair PO intake with fair-good appetite. No specific food and fluid preferences discussed. No nausea, vomit, diarrhea, constipation. No BM reported per RN flowsheets. Labs all within acceptable. Dosing weight is@ 79.4kg (9/26), per [facility] record 167.6cm, BMI calculated at 28.2. Nutrition education provided and general heathy eating pattern handout for patient this tour.

## 2024-09-27 NOTE — DISCHARGE NOTE NURSING/CASE MANAGEMENT/SOCIAL WORK - PATIENT PORTAL LINK FT
You can access the FollowMyHealth Patient Portal offered by [facility] by registering at the following website: [email or web address]. By joining [facility]’s FollowMyHealth portal, you will also be able to view your health information using other applications (apps) compatible with our system.

## 2024-09-27 NOTE — DIETITIAN INITIAL EVALUATION ADULT - ORAL INTAKE PTA/DIET HISTORY
Patient reports NKFA. Patient is basically following lacto-ovo vegetarian, can eat chicken/turkey. No fish/meats. Patient lives alone, no motivation to cook. Reports mostly eating two meals daily with difficulty monitoring portion (eating 4 eggs at breakfast etc), sometimes he eats very little.  lbs with recent weight gain 1-2 years from 160-165 lbs.

## 2024-09-27 NOTE — PROGRESS NOTE ADULT - SUBJECTIVE AND OBJECTIVE BOX
*******************************  Victorina Robles MD (PGY-1)  Internal Medicine  Contact via [employer] TEAMS  *******************************      SUSIE GRANDA  49y  MRN: [id number]    Patient is a 49y old  Male who presents with a chief complaint of Multifocal pneumonia (26 Sep 2024 23:00)      Interval/Overnight Events: no events ON.     Subjective: Pt seen and examined at bedside. Denies fever, CP, SOB, abn pain, N/V, dysuria. Tolerating diet.      MEDICATIONS  (STANDING):  azithromycin   Tablet 500 milliGRAM(s) Oral daily  cefTRIAXone   IVPB 1000 milliGRAM(s) IV Intermittent every 24 hours  chlorhexidine 2% Cloths 1 Application(s) Topical daily  influenza   Vaccine 0.5 milliLiter(s) IntraMuscular once    MEDICATIONS  (PRN):  acetaminophen     Tablet .. 650 milliGRAM(s) Oral every 6 hours PRN Temp greater or equal to 38C (100.4F), Mild Pain (1 - 3)  albuterol    90 MICROgram(s) HFA Inhaler 2 Puff(s) Inhalation every 6 hours PRN Shortness of Breath and/or Wheezing      Objective:    Vitals: Vital Signs Last 24 Hrs  T(C): 37.1 (09-27-24 @ 05:33), Max: 37.4 (09-26-24 @ 19:55)  T(F): 98.8 (09-27-24 @ 05:33), Max: 99.4 (09-26-24 @ 19:55)  HR: 84 (09-27-24 @ 05:33) (84 - 117)  BP: 109/68 (09-27-24 @ 05:33) (109/68 - 125/83)  BP(mean): --  RR: 18 (09-27-24 @ 05:33) (18 - 19)  SpO2: 98% (09-27-24 @ 05:33) (92% - 98%)                I&O's Summary      PHYSICAL EXAM:  GENERAL: NAD  HEAD:  Atraumatic, Normocephalic  EYES: EOMI, conjunctiva and sclera clear  CHEST/LUNG: Clear to auscultation bilaterally; No rales, rhonchi, wheezing, or rubs  HEART: Regular rate and rhythm; No murmurs, rubs, or gallops  ABDOMEN: Soft, Nontender, Nondistended;   SKIN: No rashes or lesions  NERVOUS SYSTEM:  Alert & Oriented X3, no focal deficits    LABS:                        11.2   22.82 )-----------( 326      ( 26 Sep 2024 16:30 )             33.1     09-26    131[L]  |  96[L]  |  22  ----------------------------<  98  4.3   |  21[L]  |  1.17    Ca    9.1      26 Sep 2024 16:30    TPro  8.1  /  Alb  3.0[L]  /  TBili  1.3[H]  /  DBili  x   /  AST  75[H]  /  ALT  52[H]  /  AlkPhos  134[H]  09-26    CAPILLARY BLOOD GLUCOSE            Urinalysis Basic - ( 26 Sep 2024 16:30 )    Color: x / Appearance: x / SG: x / pH: x  Gluc: 98 mg/dL / Ketone: x  / Bili: x / Urobili: x   Blood: x / Protein: x / Nitrite: x   Leuk Esterase: x / RBC: x / WBC x   Sq Epi: x / Non Sq Epi: x / Bacteria: x          RADIOLOGY & ADDITIONAL TESTS:         *******************************  Victorina Robles MD (PGY-1)  Internal Medicine  Contact via [employer] TEAMS  *******************************      SUSIE GRANDA  49y  MRN: [id number]    Patient is a 49y old  Male who presents with a chief complaint of Multifocal pneumonia (26 Sep 2024 23:00)      Interval/Overnight Events: no events ON.     Subjective: Pt seen and examined at bedside. States that he feels better today, night sweats, fever, and SOB have almost resolved. Has been able to ambulate without being SOB. Denies fever, CP, SOB, abn pain, N/V, dysuria. Tolerating diet.      MEDICATIONS  (STANDING):  azithromycin   Tablet 500 milliGRAM(s) Oral daily  cefTRIAXone   IVPB 1000 milliGRAM(s) IV Intermittent every 24 hours  chlorhexidine 2% Cloths 1 Application(s) Topical daily  influenza   Vaccine 0.5 milliLiter(s) IntraMuscular once    MEDICATIONS  (PRN):  acetaminophen     Tablet .. 650 milliGRAM(s) Oral every 6 hours PRN Temp greater or equal to 38C (100.4F), Mild Pain (1 - 3)  albuterol    90 MICROgram(s) HFA Inhaler 2 Puff(s) Inhalation every 6 hours PRN Shortness of Breath and/or Wheezing      Objective:    Vitals: Vital Signs Last 24 Hrs  T(C): 37.1 (09-27-24 @ 05:33), Max: 37.4 (09-26-24 @ 19:55)  T(F): 98.8 (09-27-24 @ 05:33), Max: 99.4 (09-26-24 @ 19:55)  HR: 84 (09-27-24 @ 05:33) (84 - 117)  BP: 109/68 (09-27-24 @ 05:33) (109/68 - 125/83)  BP(mean): --  RR: 18 (09-27-24 @ 05:33) (18 - 19)  SpO2: 98% (09-27-24 @ 05:33) (92% - 98%)                I&O's Summary      PHYSICAL EXAM:  GENERAL: NAD  HEAD:  Atraumatic, Normocephalic  EYES: EOMI, conjunctiva and sclera clear  CHEST/LUNG: Clear to auscultation bilaterally; No rales, rhonchi, wheezing, or rubs  HEART: Regular rate and rhythm; No murmurs, rubs, or gallops  ABDOMEN: Soft, Nontender, Nondistended;   SKIN: No rashes or lesions  NERVOUS SYSTEM:  Alert & Oriented X3, no focal deficits    LABS:                        11.2   22.82 )-----------( 326      ( 26 Sep 2024 16:30 )             33.1     09-26    131[L]  |  96[L]  |  22  ----------------------------<  98  4.3   |  21[L]  |  1.17    Ca    9.1      26 Sep 2024 16:30    TPro  8.1  /  Alb  3.0[L]  /  TBili  1.3[H]  /  DBili  x   /  AST  75[H]  /  ALT  52[H]  /  AlkPhos  134[H]  09-26    CAPILLARY BLOOD GLUCOSE            Urinalysis Basic - ( 26 Sep 2024 16:30 )    Color: x / Appearance: x / SG: x / pH: x  Gluc: 98 mg/dL / Ketone: x  / Bili: x / Urobili: x   Blood: x / Protein: x / Nitrite: x   Leuk Esterase: x / RBC: x / WBC x   Sq Epi: x / Non Sq Epi: x / Bacteria: x          RADIOLOGY & ADDITIONAL TESTS:         *******************************  Victorina Robles MD (PGY-1)  Internal Medicine  Contact via [employer] TEAMS  *******************************      SUSIE GRANDA  49y  MRN: [id number]    Patient is a 49y old  Male who presents with a chief complaint of Multifocal pneumonia (26 Sep 2024 23:00)      Interval/Overnight Events: no events ON.     Subjective: Pt seen and examined at bedside. States that he feels better today, night sweats, fever, and SOB have almost resolved. Has been able to ambulate without being SOB. Denies fever, CP, SOB, abn pain, N/V, dysuria. Tolerating diet.      MEDICATIONS  (STANDING):  azithromycin   Tablet 500 milliGRAM(s) Oral daily  cefTRIAXone   IVPB 1000 milliGRAM(s) IV Intermittent every 24 hours  chlorhexidine 2% Cloths 1 Application(s) Topical daily  influenza   Vaccine 0.5 milliLiter(s) IntraMuscular once    MEDICATIONS  (PRN):  acetaminophen     Tablet .. 650 milliGRAM(s) Oral every 6 hours PRN Temp greater or equal to 38C (100.4F), Mild Pain (1 - 3)  albuterol    90 MICROgram(s) HFA Inhaler 2 Puff(s) Inhalation every 6 hours PRN Shortness of Breath and/or Wheezing      Objective:    Vitals: Vital Signs Last 24 Hrs  T(C): 37.1 (09-27-24 @ 05:33), Max: 37.4 (09-26-24 @ 19:55)  T(F): 98.8 (09-27-24 @ 05:33), Max: 99.4 (09-26-24 @ 19:55)  HR: 84 (09-27-24 @ 05:33) (84 - 117)  BP: 109/68 (09-27-24 @ 05:33) (109/68 - 125/83)  BP(mean): --  RR: 18 (09-27-24 @ 05:33) (18 - 19)  SpO2: 98% (09-27-24 @ 05:33) (92% - 98%)                I&O's Summary      PHYSICAL EXAM:  GENERAL: NAD  HEAD:  Atraumatic, Normocephalic  EYES: EOMI, conjunctiva and sclera clear  CHEST/LUNG: Crackles b/l diffusely   HEART: Regular rate and rhythm; No murmurs, rubs, or gallops  ABDOMEN: Soft, Nontender, Nondistended;   SKIN: No rashes or lesions  NERVOUS SYSTEM:  Alert & Oriented X3, no focal deficits    LABS:                        11.2   22.82 )-----------( 326      ( 26 Sep 2024 16:30 )             33.1     09-26    131[L]  |  96[L]  |  22  ----------------------------<  98  4.3   |  21[L]  |  1.17    Ca    9.1      26 Sep 2024 16:30    TPro  8.1  /  Alb  3.0[L]  /  TBili  1.3[H]  /  DBili  x   /  AST  75[H]  /  ALT  52[H]  /  AlkPhos  134[H]  09-26    CAPILLARY BLOOD GLUCOSE            Urinalysis Basic - ( 26 Sep 2024 16:30 )    Color: x / Appearance: x / SG: x / pH: x  Gluc: 98 mg/dL / Ketone: x  / Bili: x / Urobili: x   Blood: x / Protein: x / Nitrite: x   Leuk Esterase: x / RBC: x / WBC x   Sq Epi: x / Non Sq Epi: x / Bacteria: x          RADIOLOGY & ADDITIONAL TESTS:

## 2024-09-27 NOTE — DISCHARGE NOTE PROVIDER - NSDCCPTREATMENT_GEN_ALL_CORE_FT
PRINCIPAL PROCEDURE  Procedure: XR chest, 2 views  Findings and Treatment: IMPRESSION:  Bilateral airspace opacities most consistent with multifocal pneumonia   (pulmonary edema much less likely)  --- End of Report ---

## 2024-09-27 NOTE — DISCHARGE NOTE PROVIDER - NSDCCPCAREPLAN_GEN_ALL_CORE_FT
PRINCIPAL DISCHARGE DIAGNOSIS  Diagnosis: Multifocal pneumonia  Assessment and Plan of Treatment: You came to the hospital because you had fever, shortness of breath, and weakness. You were found to have an infection in your lungs. We treated you with antibiotics which improved your symptoms. Before you left, you were able to walk comfortably and eat. Please make sure to take your oral antibiotics after you leave the hospital. Please make sure to follow up with your primary care provider, and seek medical care if you experience fever, shortness of breath, nausea/vomiting, weakness, confusion      SECONDARY DISCHARGE DIAGNOSES  Diagnosis: Microcytic anemia  Assessment and Plan of Treatment:     Diagnosis: Transaminitis  Assessment and Plan of Treatment:     Diagnosis: Abnormal serum protein electrophoresis  Assessment and Plan of Treatment:      PRINCIPAL DISCHARGE DIAGNOSIS  Diagnosis: Multifocal pneumonia  Assessment and Plan of Treatment: You came to the hospital because you had fever, shortness of breath, and weakness. You were found to have an infection in your lungs. We treated you with antibiotics which improved your symptoms. Before you left, you were able to walk comfortably and eat. Please make sure to take your oral antibiotics after you leave the hospital. Additionally, please obtain a repeat CT chest or Chest x-ray to ensure resolution of your pneumonia. Please make sure to follow up with your primary care provider, and seek medical care if you experience fever, shortness of breath, nausea/vomiting, weakness, confusion, or any new or worsening symptoms.      SECONDARY DISCHARGE DIAGNOSES  Diagnosis: Transaminitis  Assessment and Plan of Treatment: Your liver function numbers were above the normal range when you came to the ED and although improved the next day, was still elevated. Please follow up with your primary care provider and receive outpatient labwork to make sure that your labs return to normal.    Diagnosis: Microcytic anemia  Assessment and Plan of Treatment: You were found to have a condition where your red blood cells are smaller than usual. You did not have any symptoms that were concerning - however, please follow up with your primary care provider to see if any workup or treatment is needed.    Diagnosis: Abnormal serum protein electrophoresis  Assessment and Plan of Treatment: Your past medical records show that you had an abnormal finding on your protein levels. Please follow up with your outpatient provider to see if there is any workup or treatment that nees to be completed.

## 2024-09-27 NOTE — PROGRESS NOTE ADULT - ASSESSMENT
48 yo male active smoker (1.5 PPD for 20 years) with no other significant PMHx admitted for multifocal pneumonia, found to have transaminitis and increased beta globulin proteins on SPEP.   48 yo male active smoker (1.5 PPD for 20 years) with no other significant PMHx admitted for multifocal pneumonia, found to have transaminitis and increased beta globulin proteins on SPEP. Stable for discharge on 9/27/2024.

## 2024-09-27 NOTE — DIETITIAN INITIAL EVALUATION ADULT - PERTINENT LABORATORY DATA
09-27    136  |  101  |  20  ----------------------------<  79  3.8   |  19[L]  |  1.15    Ca    8.4      27 Sep 2024 05:00  Phos  4.0     09-27  Mg     2.50     09-27    TPro  7.2  /  Alb  2.8[L]  /  TBili  0.8  /  DBili  0.4[H]  /  AST  64[H]  /  ALT  50[H]  /  AlkPhos  117  09-27  A1C with Estimated Average Glucose Result: 5.4 % (09-26-24 @ 16:30)

## 2024-09-27 NOTE — PROGRESS NOTE ADULT - ATTENDING COMMENTS
Pt reports substantial improvement in respiratory symptoms, energy, and appetite. Afebrile, VS stable, leukocytosis markedly improved. Monitor clinical course today. If pt continues to improve and can ambulate off O2 with good SpO2 will plan for d/c home later today on oral abx. Pt is in agreement with this plan. Time planning discharge 35 minutes.

## 2024-09-27 NOTE — DISCHARGE NOTE PROVIDER - HOSPITAL COURSE
HPI:  Patient is a 50 yo male active smoker (1.5 PPD for 20 years) with no other significant PMHx who presents with 1 week of cough, myalgias, night sweats and SOB. Patient reports feeling ill about 7 days ago, generalized myalgias and fatigue. Took temperature at home, found to be 103 F. Remained febrile for the next few days, with development of dry cough and SOB. Sleeping poorly at night and complaining of drenching night sweats, forcing him to change his clothes. Reports some associated diarrhea and decreased appetite. States that his fever began to drop about 2 days ago, but SOB remained. Saw his PMD yesterday, who told him one of his lungs sounded "funny". Prescribed him an antibiotic, which he has taken twice, and parth labs. Today, PMD called him and said labs were abnormal. Subsequently advised to go to the ED.     Hospital course: Patient admitted for sepsis secondary to multifocal pneumonia. Pt tachycardic to 105 and leukocytosis 22 on CBC. Pneumonia treated with ceftriaxone and azithromycin for 1 day and reported symptomatic improvement. Patient afebrile, leukocytosis decreased to 13, and able to ambulate while maintaining oxygen saturation >95. Complete blood count showed microcytic anemia, patient reports that he had a history of it in the past. In terms of SPEP (result from July 2024), patient agreed to follow up outpatient. Patient will be discharged on cefpodoxime and azithromycin on 9/27/2024.    HPI:  Patient is a 48 yo male active smoker (1.5 PPD for 20 years) with no other significant PMHx who presents with 1 week of cough, myalgias, night sweats and SOB. Patient reports feeling ill about 7 days ago, generalized myalgias and fatigue. Took temperature at home, found to be 103 F. Remained febrile for the next few days, with development of dry cough and SOB. Sleeping poorly at night and complaining of drenching night sweats, forcing him to change his clothes. Reports some associated diarrhea and decreased appetite. States that his fever began to drop about 2 days ago, but SOB remained. Saw his PMD yesterday, who told him one of his lungs sounded "funny". Prescribed him an antibiotic, which he has taken twice, and parth labs. Today, PMD called him and said labs were abnormal. Subsequently advised to go to the ED.     Hospital course: Patient admitted for sepsis secondary to multifocal pneumonia. Pt tachycardic to 105 and leukocytosis 22 on CBC. Xray showing bilateral airspace opacities most consistent with multifocal pneumonia. Procal elevated to 1.17. Pneumonia treated with ceftriaxone and azithromycin for 1 day and patient reported symptomatic improvement. Patient afebrile, leukocytosis decreased to 13, and able to ambulate while maintaining oxygen saturation >95. Complete blood count showed microcytic anemia, patient reports that he had a history of it in the past. In terms of SPEP (result from July 2024), patient agreed to follow up outpatient. Patient will be discharged on cefpodoxime and azithromycin on 9/27/2024.     Follow up:  1. Blood cultures    Discharge diagnosis:  1. Multifocal pneumonia    HPI:  Patient is a 48 yo male active smoker (1.5 PPD for 20 years) with no other significant PMHx who presents with 1 week of cough, myalgias, night sweats and SOB. Patient reports feeling ill about 7 days ago, generalized myalgias and fatigue. Took temperature at home, found to be 103 F. Remained febrile for the next few days, with development of dry cough and SOB. Sleeping poorly at night and complaining of drenching night sweats, forcing him to change his clothes. Reports some associated diarrhea and decreased appetite. States that his fever began to drop about 2 days ago, but SOB remained. Saw his PMD yesterday, who told him one of his lungs sounded "funny". Prescribed him an antibiotic, which he has taken twice, and parth labs. Today, PMD called him and said labs were abnormal. Subsequently advised to go to the ED.     Hospital course: Patient admitted for sepsis secondary to multifocal pneumonia. Pt tachycardic to 105 and leukocytosis 22 on CBC. Xray showing bilateral airspace opacities most consistent with multifocal pneumonia. Procal elevated to 1.17. Pneumonia treated with ceftriaxone and azithromycin for 1 day and patient reported symptomatic improvement. Patient afebrile, leukocytosis decreased to 13, and able to ambulate while maintaining oxygen saturation >95. Complete blood count showed microcytic anemia, patient reports that he had a history of it in the past. In terms of SPEP (result from July 2024), patient agreed to follow up outpatient. Patient will be discharged on cefpodoxime and azithromycin on 9/27/2024.     Follow up:  1. Blood cultures  2. Hepatitis panel  3. Hemoglobin electrophoresis     Discharge diagnosis:  1. Multifocal pneumonia

## 2024-10-01 LAB — LEGIONELLA AB SER QL: NON REACTIVE

## 2024-10-02 LAB
CULTURE RESULTS: SIGNIFICANT CHANGE UP
CULTURE RESULTS: SIGNIFICANT CHANGE UP
SPECIMEN SOURCE: SIGNIFICANT CHANGE UP
SPECIMEN SOURCE: SIGNIFICANT CHANGE UP

## 2024-10-07 ENCOUNTER — APPOINTMENT (OUTPATIENT)
Age: 49
End: 2024-10-07
Payer: COMMERCIAL

## 2024-10-07 ENCOUNTER — LABORATORY RESULT (OUTPATIENT)
Age: 49
End: 2024-10-07

## 2024-10-07 VITALS
HEART RATE: 110 BPM | RESPIRATION RATE: 14 BRPM | DIASTOLIC BLOOD PRESSURE: 79 MMHG | HEIGHT: 66 IN | BODY MASS INDEX: 26.2 KG/M2 | WEIGHT: 163 LBS | OXYGEN SATURATION: 96 % | SYSTOLIC BLOOD PRESSURE: 118 MMHG | TEMPERATURE: 98.7 F

## 2024-10-07 VITALS — HEART RATE: 92 BPM

## 2024-10-07 DIAGNOSIS — R06.09 OTHER FORMS OF DYSPNEA: ICD-10-CM

## 2024-10-07 DIAGNOSIS — F17.211 NICOTINE DEPENDENCE, CIGARETTES, IN REMISSION: ICD-10-CM

## 2024-10-07 DIAGNOSIS — D64.9 ANEMIA, UNSPECIFIED: ICD-10-CM

## 2024-10-07 DIAGNOSIS — J40 BRONCHITIS, NOT SPECIFIED AS ACUTE OR CHRONIC: ICD-10-CM

## 2024-10-07 PROCEDURE — 99214 OFFICE O/P EST MOD 30 MIN: CPT

## 2024-10-08 ENCOUNTER — NON-APPOINTMENT (OUTPATIENT)
Age: 49
End: 2024-10-08

## 2024-10-08 LAB
ALBUMIN SERPL ELPH-MCNC: 3.5 G/DL
ALP BLD-CCNC: 96 U/L
ALT SERPL-CCNC: 46 U/L
ANION GAP SERPL CALC-SCNC: 17 MMOL/L
AST SERPL-CCNC: 36 U/L
BILIRUB SERPL-MCNC: 0.6 MG/DL
BUN SERPL-MCNC: 18 MG/DL
CALCIUM SERPL-MCNC: 9.7 MG/DL
CHLORIDE SERPL-SCNC: 101 MMOL/L
CO2 SERPL-SCNC: 20 MMOL/L
CREAT SERPL-MCNC: 1.08 MG/DL
EGFR: 85 ML/MIN/1.73M2
GLUCOSE SERPL-MCNC: 98 MG/DL
IRON SATN MFR SERPL: 37 %
IRON SERPL-MCNC: 120 UG/DL
POTASSIUM SERPL-SCNC: 5 MMOL/L
PROT SERPL-MCNC: 8.5 G/DL
SODIUM SERPL-SCNC: 139 MMOL/L
TIBC SERPL-MCNC: 328 UG/DL
UIBC SERPL-MCNC: 208 UG/DL

## 2024-10-10 DIAGNOSIS — D58.2 OTHER HEMOGLOBINOPATHIES: ICD-10-CM

## 2024-10-10 DIAGNOSIS — R77.1 ABNORMALITY OF GLOBULIN: ICD-10-CM

## 2024-10-10 LAB
HCT VFR BLD CALC: 36.5 %
HGB BLD-MCNC: 11.5 G/DL
MCHC RBC-ENTMCNC: 19.3 PG
MCHC RBC-ENTMCNC: 31.5 GM/DL
MCV RBC AUTO: 61.1 FL
PLATELET # BLD AUTO: 545 K/UL
RBC # BLD: 5.97 M/UL
RBC # FLD: 20.4 %
WBC # FLD AUTO: 8.9 K/UL

## 2024-10-11 DIAGNOSIS — J18.9 PNEUMONIA, UNSPECIFIED ORGANISM: ICD-10-CM

## 2024-10-11 LAB
ALBUMIN MFR SERPL ELPH: 42.6 %
ALBUMIN SERPL-MCNC: 3.6 G/DL
ALBUMIN/GLOB SERPL: 0.7 RATIO
ALPHA1 GLOB MFR SERPL ELPH: 4.4 %
ALPHA1 GLOB SERPL ELPH-MCNC: 0.4 G/DL
ALPHA2 GLOB MFR SERPL ELPH: 8.8 %
ALPHA2 GLOB SERPL ELPH-MCNC: 0.7 G/DL
B-GLOBULIN MFR SERPL ELPH: 20 %
B-GLOBULIN SERPL ELPH-MCNC: 1.7 G/DL
GAMMA GLOB FLD ELPH-MCNC: 2.1 G/DL
GAMMA GLOB MFR SERPL ELPH: 24.2 %
INTERPRETATION SERPL IEP-IMP: NORMAL
M PROTEIN MFR SERPL ELPH: NORMAL
MONOCLON BAND OBS SERPL: NORMAL
PROT SERPL-MCNC: 8.5 G/DL
PROT SERPL-MCNC: 8.5 G/DL

## 2024-11-05 ENCOUNTER — NON-APPOINTMENT (OUTPATIENT)
Age: 49
End: 2024-11-05

## 2024-12-16 ENCOUNTER — NON-APPOINTMENT (OUTPATIENT)
Age: 49
End: 2024-12-16

## 2024-12-16 ENCOUNTER — APPOINTMENT (OUTPATIENT)
Age: 49
End: 2024-12-16
Payer: COMMERCIAL

## 2024-12-16 VITALS
DIASTOLIC BLOOD PRESSURE: 82 MMHG | RESPIRATION RATE: 16 BRPM | TEMPERATURE: 98 F | BODY MASS INDEX: 26.2 KG/M2 | HEART RATE: 77 BPM | WEIGHT: 163 LBS | HEIGHT: 66 IN | OXYGEN SATURATION: 98 % | SYSTOLIC BLOOD PRESSURE: 144 MMHG

## 2024-12-16 DIAGNOSIS — R74.8 ABNORMAL LEVELS OF OTHER SERUM ENZYMES: ICD-10-CM

## 2024-12-16 DIAGNOSIS — F17.211 NICOTINE DEPENDENCE, CIGARETTES, IN REMISSION: ICD-10-CM

## 2024-12-16 DIAGNOSIS — R06.09 OTHER FORMS OF DYSPNEA: ICD-10-CM

## 2024-12-16 PROCEDURE — 99204 OFFICE O/P NEW MOD 45 MIN: CPT | Mod: 25

## 2024-12-16 PROCEDURE — 93000 ELECTROCARDIOGRAM COMPLETE: CPT

## 2024-12-19 ENCOUNTER — APPOINTMENT (OUTPATIENT)
Age: 49
End: 2024-12-19

## 2024-12-19 PROCEDURE — 93306 TTE W/DOPPLER COMPLETE: CPT | Mod: 59

## 2025-01-02 ENCOUNTER — APPOINTMENT (OUTPATIENT)
Age: 50
End: 2025-01-02
Payer: COMMERCIAL

## 2025-01-02 VITALS
HEART RATE: 112 BPM | WEIGHT: 163 LBS | TEMPERATURE: 98 F | RESPIRATION RATE: 16 BRPM | DIASTOLIC BLOOD PRESSURE: 84 MMHG | OXYGEN SATURATION: 96 % | BODY MASS INDEX: 26.2 KG/M2 | HEIGHT: 66 IN | SYSTOLIC BLOOD PRESSURE: 125 MMHG

## 2025-01-02 DIAGNOSIS — R06.09 OTHER FORMS OF DYSPNEA: ICD-10-CM

## 2025-01-02 DIAGNOSIS — R74.8 ABNORMAL LEVELS OF OTHER SERUM ENZYMES: ICD-10-CM

## 2025-01-02 DIAGNOSIS — F17.211 NICOTINE DEPENDENCE, CIGARETTES, IN REMISSION: ICD-10-CM

## 2025-01-02 PROCEDURE — 99214 OFFICE O/P EST MOD 30 MIN: CPT

## 2025-01-09 ENCOUNTER — APPOINTMENT (OUTPATIENT)
Age: 50
End: 2025-01-09

## 2025-01-09 VITALS
SYSTOLIC BLOOD PRESSURE: 126 MMHG | HEIGHT: 66 IN | HEART RATE: 89 BPM | DIASTOLIC BLOOD PRESSURE: 86 MMHG | BODY MASS INDEX: 31.34 KG/M2 | TEMPERATURE: 98 F | OXYGEN SATURATION: 98 % | WEIGHT: 195 LBS | RESPIRATION RATE: 14 BRPM

## 2025-01-09 DIAGNOSIS — J18.9 PNEUMONIA, UNSPECIFIED ORGANISM: ICD-10-CM

## 2025-01-09 DIAGNOSIS — R94.2 ABNORMAL RESULTS OF PULMONARY FUNCTION STUDIES: ICD-10-CM

## 2025-01-09 DIAGNOSIS — E66.9 OBESITY, UNSPECIFIED: ICD-10-CM

## 2025-01-09 DIAGNOSIS — R06.09 OTHER FORMS OF DYSPNEA: ICD-10-CM

## 2025-01-09 DIAGNOSIS — G47.8 OTHER SLEEP DISORDERS: ICD-10-CM

## 2025-01-09 PROCEDURE — ZZZZZ: CPT

## 2025-01-09 PROCEDURE — 94729 DIFFUSING CAPACITY: CPT

## 2025-01-09 PROCEDURE — 94726 PLETHYSMOGRAPHY LUNG VOLUMES: CPT

## 2025-01-09 PROCEDURE — 93015 CV STRESS TEST SUPVJ I&R: CPT | Mod: 59

## 2025-01-09 PROCEDURE — 94060 EVALUATION OF WHEEZING: CPT

## 2025-01-09 PROCEDURE — 99214 OFFICE O/P EST MOD 30 MIN: CPT | Mod: 25

## 2025-01-13 LAB
ANION GAP SERPL CALC-SCNC: 11 MMOL/L
BUN SERPL-MCNC: 15 MG/DL
CALCIUM SERPL-MCNC: 9.3 MG/DL
CHLORIDE SERPL-SCNC: 101 MMOL/L
CO2 SERPL-SCNC: 24 MMOL/L
CREAT SERPL-MCNC: 1.13 MG/DL
DEPRECATED D DIMER PPP IA-ACNC: <150 NG/ML DDU
EGFR: 81 ML/MIN/1.73M2
GLUCOSE SERPL-MCNC: 86 MG/DL
POTASSIUM SERPL-SCNC: 4.4 MMOL/L
SODIUM SERPL-SCNC: 137 MMOL/L
TOTAL IGE SMQN RAST: 365 KU/L

## 2025-01-14 PROBLEM — E66.9 OBESITY (BMI 30-39.9): Status: ACTIVE | Noted: 2025-01-14

## 2025-02-06 ENCOUNTER — APPOINTMENT (OUTPATIENT)
Age: 50
End: 2025-02-06
Payer: COMMERCIAL

## 2025-02-06 VITALS
DIASTOLIC BLOOD PRESSURE: 83 MMHG | BODY MASS INDEX: 31.34 KG/M2 | WEIGHT: 195 LBS | RESPIRATION RATE: 16 BRPM | TEMPERATURE: 98 F | HEART RATE: 88 BPM | SYSTOLIC BLOOD PRESSURE: 122 MMHG | OXYGEN SATURATION: 98 % | HEIGHT: 66 IN

## 2025-02-06 DIAGNOSIS — F17.211 NICOTINE DEPENDENCE, CIGARETTES, IN REMISSION: ICD-10-CM

## 2025-02-06 DIAGNOSIS — E66.9 OBESITY, UNSPECIFIED: ICD-10-CM

## 2025-02-06 DIAGNOSIS — R74.8 ABNORMAL LEVELS OF OTHER SERUM ENZYMES: ICD-10-CM

## 2025-02-06 PROCEDURE — 99214 OFFICE O/P EST MOD 30 MIN: CPT

## 2025-02-17 ENCOUNTER — NON-APPOINTMENT (OUTPATIENT)
Age: 50
End: 2025-02-17

## 2025-03-20 ENCOUNTER — APPOINTMENT (OUTPATIENT)
Age: 50
End: 2025-03-20

## 2025-03-20 PROCEDURE — 95800 SLP STDY UNATTENDED: CPT

## 2025-04-01 ENCOUNTER — APPOINTMENT (OUTPATIENT)
Age: 50
End: 2025-04-01
Payer: COMMERCIAL

## 2025-04-01 ENCOUNTER — NON-APPOINTMENT (OUTPATIENT)
Age: 50
End: 2025-04-01

## 2025-04-01 VITALS
WEIGHT: 197 LBS | HEIGHT: 66 IN | HEART RATE: 89 BPM | TEMPERATURE: 98.1 F | DIASTOLIC BLOOD PRESSURE: 80 MMHG | OXYGEN SATURATION: 96 % | BODY MASS INDEX: 31.66 KG/M2 | RESPIRATION RATE: 16 BRPM | SYSTOLIC BLOOD PRESSURE: 121 MMHG

## 2025-04-01 DIAGNOSIS — E66.9 OBESITY, UNSPECIFIED: ICD-10-CM

## 2025-04-01 DIAGNOSIS — G47.33 OBSTRUCTIVE SLEEP APNEA (ADULT) (PEDIATRIC): ICD-10-CM

## 2025-04-01 DIAGNOSIS — G47.8 OTHER SLEEP DISORDERS: ICD-10-CM

## 2025-04-01 PROCEDURE — 99213 OFFICE O/P EST LOW 20 MIN: CPT

## 2025-04-23 ENCOUNTER — APPOINTMENT (OUTPATIENT)
Age: 50
End: 2025-04-23
Payer: COMMERCIAL

## 2025-04-23 VITALS
HEART RATE: 101 BPM | HEIGHT: 66 IN | DIASTOLIC BLOOD PRESSURE: 76 MMHG | OXYGEN SATURATION: 97 % | TEMPERATURE: 98 F | WEIGHT: 209 LBS | SYSTOLIC BLOOD PRESSURE: 116 MMHG | RESPIRATION RATE: 15 BRPM | BODY MASS INDEX: 33.59 KG/M2

## 2025-04-23 DIAGNOSIS — E66.9 OBESITY, UNSPECIFIED: ICD-10-CM

## 2025-04-23 DIAGNOSIS — M54.9 DORSALGIA, UNSPECIFIED: ICD-10-CM

## 2025-04-23 DIAGNOSIS — M79.673 PAIN IN UNSPECIFIED FOOT: ICD-10-CM

## 2025-04-23 PROCEDURE — 99214 OFFICE O/P EST MOD 30 MIN: CPT

## 2025-04-23 RX ORDER — MELOXICAM 15 MG/1
15 TABLET ORAL DAILY
Qty: 20 | Refills: 0 | Status: ACTIVE | COMMUNITY
Start: 2025-04-23 | End: 1900-01-01

## 2025-06-10 ENCOUNTER — RX RENEWAL (OUTPATIENT)
Age: 50
End: 2025-06-10

## 2025-06-26 ENCOUNTER — APPOINTMENT (OUTPATIENT)
Age: 50
End: 2025-06-26

## 2025-06-26 ENCOUNTER — APPOINTMENT (OUTPATIENT)
Age: 50
End: 2025-06-26
Payer: COMMERCIAL

## 2025-06-26 VITALS
DIASTOLIC BLOOD PRESSURE: 76 MMHG | TEMPERATURE: 97.9 F | OXYGEN SATURATION: 97 % | BODY MASS INDEX: 32.78 KG/M2 | WEIGHT: 204 LBS | SYSTOLIC BLOOD PRESSURE: 115 MMHG | HEART RATE: 91 BPM | HEIGHT: 66 IN | RESPIRATION RATE: 14 BRPM

## 2025-06-26 PROCEDURE — 99213 OFFICE O/P EST LOW 20 MIN: CPT

## 2025-07-08 ENCOUNTER — APPOINTMENT (OUTPATIENT)
Age: 50
End: 2025-07-08

## 2025-07-09 ENCOUNTER — APPOINTMENT (OUTPATIENT)
Age: 50
End: 2025-07-09
Payer: COMMERCIAL

## 2025-07-09 VITALS
DIASTOLIC BLOOD PRESSURE: 77 MMHG | HEART RATE: 85 BPM | WEIGHT: 205 LBS | HEIGHT: 66 IN | BODY MASS INDEX: 32.95 KG/M2 | TEMPERATURE: 98 F | SYSTOLIC BLOOD PRESSURE: 119 MMHG | RESPIRATION RATE: 14 BRPM | OXYGEN SATURATION: 96 %

## 2025-07-09 PROBLEM — F17.210 CIGARETTE SMOKER: Status: RESOLVED | Noted: 2021-12-16 | Resolved: 2025-07-09

## 2025-07-09 PROBLEM — Z87.891 FORMER SMOKER: Status: ACTIVE | Noted: 2025-07-09

## 2025-07-09 PROBLEM — M25.50 ARTHRALGIA OF MULTIPLE SITES: Status: ACTIVE | Noted: 2025-07-09

## 2025-07-09 PROCEDURE — 99214 OFFICE O/P EST MOD 30 MIN: CPT
